# Patient Record
Sex: FEMALE | Race: BLACK OR AFRICAN AMERICAN | Employment: FULL TIME | URBAN - METROPOLITAN AREA
[De-identification: names, ages, dates, MRNs, and addresses within clinical notes are randomized per-mention and may not be internally consistent; named-entity substitution may affect disease eponyms.]

---

## 2020-07-10 ENCOUNTER — HOSPITAL ENCOUNTER (EMERGENCY)
Age: 65
Discharge: HOME | End: 2020-07-11
Attending: EMERGENCY MEDICINE
Payer: COMMERCIAL

## 2020-07-10 VITALS
HEIGHT: 64 IN | BODY MASS INDEX: 27.31 KG/M2 | HEART RATE: 126 BPM | RESPIRATION RATE: 16 BRPM | DIASTOLIC BLOOD PRESSURE: 79 MMHG | SYSTOLIC BLOOD PRESSURE: 134 MMHG | TEMPERATURE: 98.1 F | WEIGHT: 160 LBS | OXYGEN SATURATION: 99 %

## 2020-07-10 LAB
REASON FOR REJECTION: NORMAL
REJECTED TEST: NORMAL

## 2020-07-10 PROCEDURE — 99283 EMERGENCY DEPT VISIT LOW MDM: CPT

## 2020-07-10 RX ORDER — LIDOCAINE 4 G/G
1 PATCH TOPICAL DAILY
Status: DISCONTINUED | OUTPATIENT
Start: 2020-07-11 | End: 2020-07-11 | Stop reason: HOSPADM

## 2020-07-10 RX ORDER — ORPHENADRINE CITRATE 30 MG/ML
60 INJECTION INTRAMUSCULAR; INTRAVENOUS ONCE
Status: COMPLETED | OUTPATIENT
Start: 2020-07-10 | End: 2020-07-11

## 2020-07-10 RX ORDER — CYCLOBENZAPRINE HCL 10 MG
10 TABLET ORAL 2 TIMES DAILY PRN
COMMUNITY
Start: 2020-07-07

## 2020-07-10 RX ORDER — ORPHENADRINE CITRATE 30 MG/ML
60 INJECTION INTRAMUSCULAR; INTRAVENOUS ONCE
Status: DISCONTINUED | OUTPATIENT
Start: 2020-07-10 | End: 2020-07-10

## 2020-07-10 RX ORDER — POTASSIUM CHLORIDE 20 MEQ/1
TABLET, EXTENDED RELEASE ORAL
COMMUNITY
Start: 2019-10-22

## 2020-07-10 RX ORDER — MORPHINE SULFATE 4 MG/ML
4 INJECTION, SOLUTION INTRAMUSCULAR; INTRAVENOUS ONCE
Status: DISCONTINUED | OUTPATIENT
Start: 2020-07-10 | End: 2020-07-10

## 2020-07-10 RX ORDER — HYDROMORPHONE HYDROCHLORIDE 1 MG/ML
1 INJECTION, SOLUTION INTRAMUSCULAR; INTRAVENOUS; SUBCUTANEOUS ONCE
Status: COMPLETED | OUTPATIENT
Start: 2020-07-10 | End: 2020-07-11

## 2020-07-10 RX ORDER — 0.9 % SODIUM CHLORIDE 0.9 %
500 INTRAVENOUS SOLUTION INTRAVENOUS ONCE
Status: DISCONTINUED | OUTPATIENT
Start: 2020-07-10 | End: 2020-07-11 | Stop reason: HOSPADM

## 2020-07-10 ASSESSMENT — PAIN SCALES - GENERAL
PAINLEVEL_OUTOF10: 8
PAINLEVEL_OUTOF10: 8

## 2020-07-10 ASSESSMENT — PAIN DESCRIPTION - PAIN TYPE: TYPE: ACUTE PAIN

## 2020-07-11 LAB
A/G RATIO: 0.8 (ref 1.1–2.2)
ALBUMIN SERPL-MCNC: 3.5 G/DL (ref 3.4–5)
ALP BLD-CCNC: 84 U/L (ref 40–129)
ALT SERPL-CCNC: 26 U/L (ref 10–40)
ANION GAP SERPL CALCULATED.3IONS-SCNC: 17 MMOL/L (ref 3–16)
AST SERPL-CCNC: 35 U/L (ref 15–37)
BASOPHILS ABSOLUTE: 0 K/UL (ref 0–0.2)
BASOPHILS RELATIVE PERCENT: 0.2 %
BILIRUB SERPL-MCNC: 1.1 MG/DL (ref 0–1)
BUN BLDV-MCNC: 20 MG/DL (ref 7–20)
CALCIUM SERPL-MCNC: 9.8 MG/DL (ref 8.3–10.6)
CHLORIDE BLD-SCNC: 98 MMOL/L (ref 99–110)
CO2: 19 MMOL/L (ref 21–32)
CREAT SERPL-MCNC: 1.3 MG/DL (ref 0.6–1.2)
EOSINOPHILS ABSOLUTE: 0 K/UL (ref 0–0.6)
EOSINOPHILS RELATIVE PERCENT: 0 %
GFR AFRICAN AMERICAN: 50
GFR NON-AFRICAN AMERICAN: 41
GLOBULIN: 4.4 G/DL
GLUCOSE BLD-MCNC: 126 MG/DL (ref 70–99)
HCT VFR BLD CALC: 37.1 % (ref 36–48)
HEMOGLOBIN: 12.5 G/DL (ref 12–16)
LYMPHOCYTES ABSOLUTE: 0.6 K/UL (ref 1–5.1)
LYMPHOCYTES RELATIVE PERCENT: 9.6 %
MAGNESIUM: 2 MG/DL (ref 1.8–2.4)
MCH RBC QN AUTO: 32.6 PG (ref 26–34)
MCHC RBC AUTO-ENTMCNC: 33.8 G/DL (ref 31–36)
MCV RBC AUTO: 96.6 FL (ref 80–100)
MONOCYTES ABSOLUTE: 0.8 K/UL (ref 0–1.3)
MONOCYTES RELATIVE PERCENT: 12.4 %
NEUTROPHILS ABSOLUTE: 4.9 K/UL (ref 1.7–7.7)
NEUTROPHILS RELATIVE PERCENT: 77.8 %
PDW BLD-RTO: 15.5 % (ref 12.4–15.4)
PLATELET # BLD: 242 K/UL (ref 135–450)
PMV BLD AUTO: 7.4 FL (ref 5–10.5)
POTASSIUM SERPL-SCNC: 3.5 MMOL/L (ref 3.5–5.1)
RBC # BLD: 3.84 M/UL (ref 4–5.2)
SODIUM BLD-SCNC: 134 MMOL/L (ref 136–145)
TOTAL PROTEIN: 7.9 G/DL (ref 6.4–8.2)
WBC # BLD: 6.3 K/UL (ref 4–11)

## 2020-07-11 PROCEDURE — 83735 ASSAY OF MAGNESIUM: CPT

## 2020-07-11 PROCEDURE — 80053 COMPREHEN METABOLIC PANEL: CPT

## 2020-07-11 PROCEDURE — 96372 THER/PROPH/DIAG INJ SC/IM: CPT

## 2020-07-11 PROCEDURE — 6360000002 HC RX W HCPCS: Performed by: PHYSICIAN ASSISTANT

## 2020-07-11 PROCEDURE — 85025 COMPLETE CBC W/AUTO DIFF WBC: CPT

## 2020-07-11 PROCEDURE — 6370000000 HC RX 637 (ALT 250 FOR IP)

## 2020-07-11 RX ORDER — OXYCODONE HYDROCHLORIDE AND ACETAMINOPHEN 5; 325 MG/1; MG/1
TABLET ORAL
Status: COMPLETED
Start: 2020-07-11 | End: 2020-07-11

## 2020-07-11 RX ADMIN — OXYCODONE AND ACETAMINOPHEN: 5; 325 TABLET ORAL at 03:06

## 2020-07-11 RX ADMIN — ORPHENADRINE CITRATE 60 MG: 30 INJECTION INTRAMUSCULAR; INTRAVENOUS at 03:07

## 2020-07-11 RX ADMIN — HYDROMORPHONE HYDROCHLORIDE 1 MG: 1 INJECTION, SOLUTION INTRAMUSCULAR; INTRAVENOUS; SUBCUTANEOUS at 03:05

## 2020-07-11 ASSESSMENT — PAIN SCALES - GENERAL
PAINLEVEL_OUTOF10: 4
PAINLEVEL_OUTOF10: 10

## 2020-07-11 NOTE — ED PROVIDER NOTES
I received a phone call from this patient at about 1:45 PM.  She continues to have pain in her feet. Her symptoms are similar to her note as described by Lita Patrick from yesterday. She states she was told she would get an outpatient script for her gout but this was forgotten. Given her elevated creatinine, I will write a 5 day prescription for prednisone to treat gout. She is aware she needs to followup this week with her primary physician. Return precautions given.       Yady Swann MD  07/11/20 1562

## 2020-07-11 NOTE — ED PROVIDER NOTES
[facility]        Pt Name: Carley Shelton  MRN: [id number]  [place] 1955  Date of evaluation: 7/10/2020  Provider: Alban Claude, PA-C  PCP: Keeley Escobar     I have seen and evaluated this patient with my supervising physician Yamileth Singh MD.    [address] [facility]       Chief Complaint   Patient presents with    Back Pain     Pt reporting chronic back pain and gout flare up for a couplde days. In by EMS from home. HISTORY OF PRESENT ILLNESS   (Location, Timing/Onset, Context/Setting, Quality, Duration, Modifying Factors, Severity, Associated Signs and Symptoms)  Note limiting factors. Carley Shelton is a 60 y.o. female that presents to the emergency department with a chief complaint of some back spasms that began 5 days ago. She states she gets these frequently since last summer after she was started on chemotherapy for breast cancer and was told that it was a side effect to her chemotherapy. 2 days ago began getting some pain in her bilateral feet mostly her great toes and her right knee that feels like her gouty arthritis. She has been taking Tylenol muscle relaxants for the pain at home with minimal relief. States she has been scooting around the house to get around. Rates pain an 8 out of 10. Denies nausea, vomiting, fevers, chest pain, shortness of breath, difficulty urinating, urinary or bowel symptoms, fall or injury or trauma. She has had pain like this in her knee and feet before and states that she has had the spasms before. Denies any other symptoms. Nursing Notes were all reviewed and agreed with or any disagreements were addressed in the HPI. REVIEW OF SYSTEMS    (2-9 systems for level 4, 10 or more for level 5)     Review of Systems    Positives and Pertinent negatives as per HPI. Except as noted above in the ROS, all other systems were reviewed and negative.        PAST MEDICAL Nerves: No cranial nerve deficit. Psychiatric:         Behavior: Behavior normal.         DIAGNOSTIC RESULTS   LABS:    Labs Reviewed   COMPREHENSIVE METABOLIC PANEL - Abnormal; Notable for the following components:       Result Value    Sodium 134 (*)     Chloride 98 (*)     CO2 19 (*)     Anion Gap 17 (*)     Glucose 126 (*)     CREATININE 1.3 (*)     GFR Non- 41 (*)     GFR African American 50 (*)     Albumin/Globulin Ratio 0.8 (*)     Total Bilirubin 1.1 (*)     All other components within normal limits    Narrative:     Performed at:  [facility] [address], [address]   Phone [phone]   CBC WITH AUTO DIFFERENTIAL - Abnormal; Notable for the following components:    RBC 3.84 (*)     RDW 15.5 (*)     Lymphocytes Absolute 0.6 (*)     All other components within normal limits    Narrative:     Performed at:  [facility] [address],  [place], [address]   Phone [phone]    Narrative:     Jolie Zavala  [facility] tel. [phone],  Rejected Test CMP, MG Called to:EVELYN Martinez, 07/10/2020 23:03, by Cleo Comer  Performed at:  [facility] [address], [address]   Phone [phone]   MAGNESIUM    Narrative:     Performed at:  [facility] [address], [address]   Phone [phone]       All other labs were within normal range or not returned as of this dictation. EKG: All EKG's are interpreted by the Emergency Department Physician in the absence of a cardiologist.  Please see their note for interpretation of EKG.       RADIOLOGY:   Non-plain film images such as CT, Ultrasound and MRI are read by the radiologist. Plain radiographic images are visualized and preliminarily interpreted by the ED Provider with the below findings:        Interpretation per the Radiologist below, if available at the time of this note:    No orders to display     No results found. PROCEDURES   Unless otherwise noted below, none     Procedures    CRITICAL CARE TIME   N/A    CONSULTS:  None      EMERGENCY DEPARTMENT COURSE and DIFFERENTIAL DIAGNOSIS/MDM:   Vitals:    Vitals:    07/10/20 1918   BP: 134/79   Pulse: 126   Resp: 16   Temp: 98.1 °F (36.7 °C)   SpO2: 99%   Weight: 160 lb (72.6 kg)   Height: 5' 4\" (1.626 m)       Patient was given the following medications:  Medications   HYDROmorphone HCl PF (DILAUDID) injection 1 mg (1 mg Intramuscular Given 7/11/20 0305)   orphenadrine (NORFLEX) injection 60 mg (60 mg Intramuscular Given 7/11/20 0307)   oxyCODONE-acetaminophen (PERCOCET) 5-325 MG per tablet (  Given 7/11/20 0306)           Patient presented with some spasming in her low back with some right knee pain and bilateral foot pain. She states her knee pain and foot pain feels like her gout that she has had in the past.  She also states that she has had back spasms like this and relates this to her chemotherapy that she is on. She recently had PET scan on 6/5 that revealed some new asymmetric right L5-S1 facet uptake but was favored to be likely secondary to arthrosis. She was slightly tachycardic on presentation. Nursing staff struggled with IV access and patient was pushing oral fluids in the emergency department. Patient's stay have been during [facility] downtime so some vitals and documentation are not currently in [facility]. Still awaiting laboratory results at the end of my shift at midnight. Patient was feeling better after medication. Low suspicion for cauda equina, epidural abscess, cord injury, pyelonephritis, AAA, septic arthritis, cellulitis, arterial occlusion, DVT, acute abdomen or other emergent etiology. Do not believe imaging is warranted as patient has had pain like this in the past and recently had a PET scan. Please see attending note for final disposition. FINAL IMPRESSION      1.  Acute bilateral low back pain without sciatica    2. Arthralgia, unspecified joint          DISPOSITION/PLAN   DISPOSITION        PATIENT REFERREDTO:  No follow-up provider specified.     DISCHARGE MEDICATIONS:  Discharge Medication List as of 7/11/2020  3:10 AM          DISCONTINUED MEDICATIONS:  Discharge Medication List as of 7/11/2020  3:10 AM                 (Please note that portions of this note were completed with a voice recognition program.  Efforts were made to edit the dictations but occasionally words are mis-transcribed.)    Oanh Ruvalcaba PA-C (electronically signed)            Oanh uRvalcaba PA-C  07/16/20 1011

## 2020-07-12 NOTE — ED PROVIDER NOTES
As physician-in-triage, I performed a medical screening history and physical exam on Liamjozefkane RodriguezMarina Del Rey. I also cared for and evaluated the patient in conjunction with the ED Advanced Practice Provider. All diagnostic, treatment, and disposition decisions were made by myself in conjunction with the advanced practice provider. For all further details of the patient's emergency department visit, please see the advanced practice provider's documentation. Patient presents the ER for evaluation of acute on subacute back pain, with a prior history of breast cancer with prior history of mets no new metastases, no severe electrolyte abnormalities analgesia provided musculus with marked improvement in symptoms. Patient stable for outpatient management and outpatient follow-up with her oncologist and treating physicians.       Impression: Muscle skeletal back pain, metastatic cancer      Francesca Oden MD  54/53/21 1030       Francesca Oden MD  20/45/58 1212

## 2021-04-25 ENCOUNTER — APPOINTMENT (OUTPATIENT)
Dept: GENERAL RADIOLOGY | Age: 66
End: 2021-04-25
Payer: COMMERCIAL

## 2021-04-25 ENCOUNTER — HOSPITAL ENCOUNTER (EMERGENCY)
Age: 66
Discharge: HOME | End: 2021-04-25
Payer: COMMERCIAL

## 2021-04-25 VITALS
BODY MASS INDEX: 27.46 KG/M2 | HEIGHT: 64 IN | HEART RATE: 84 BPM | OXYGEN SATURATION: 99 % | RESPIRATION RATE: 16 BRPM | TEMPERATURE: 98 F | DIASTOLIC BLOOD PRESSURE: 79 MMHG | SYSTOLIC BLOOD PRESSURE: 144 MMHG

## 2021-04-25 DIAGNOSIS — G89.29 CHRONIC LEFT-SIDED LOW BACK PAIN WITHOUT SCIATICA: ICD-10-CM

## 2021-04-25 DIAGNOSIS — I89.0 LYMPHEDEMA OF RIGHT ARM: ICD-10-CM

## 2021-04-25 DIAGNOSIS — M86.9 OSTEITIS PUBIS (HCC): Primary | ICD-10-CM

## 2021-04-25 DIAGNOSIS — M47.816 ARTHRITIS OF LUMBAR SPINE: ICD-10-CM

## 2021-04-25 DIAGNOSIS — M54.50 CHRONIC LEFT-SIDED LOW BACK PAIN WITHOUT SCIATICA: ICD-10-CM

## 2021-04-25 DIAGNOSIS — R10.2 PELVIC PAIN: ICD-10-CM

## 2021-04-25 PROCEDURE — 99285 EMERGENCY DEPT VISIT HI MDM: CPT

## 2021-04-25 PROCEDURE — 6370000000 HC RX 637 (ALT 250 FOR IP): Performed by: PHYSICIAN ASSISTANT

## 2021-04-25 PROCEDURE — 73522 X-RAY EXAM HIPS BI 3-4 VIEWS: CPT

## 2021-04-25 PROCEDURE — 72100 X-RAY EXAM L-S SPINE 2/3 VWS: CPT

## 2021-04-25 RX ORDER — OXYCODONE HYDROCHLORIDE AND ACETAMINOPHEN 5; 325 MG/1; MG/1
1 TABLET ORAL EVERY 6 HOURS PRN
Qty: 12 TABLET | Refills: 0 | Status: SHIPPED | OUTPATIENT
Start: 2021-04-25 | End: 2021-04-28

## 2021-04-25 RX ORDER — OXYCODONE HYDROCHLORIDE 5 MG/1
5 TABLET ORAL ONCE
Status: COMPLETED | OUTPATIENT
Start: 2021-04-25 | End: 2021-04-25

## 2021-04-25 RX ORDER — ORPHENADRINE CITRATE 100 MG/1
100 TABLET, EXTENDED RELEASE ORAL ONCE
Status: COMPLETED | OUTPATIENT
Start: 2021-04-25 | End: 2021-04-25

## 2021-04-25 RX ORDER — PREDNISONE 10 MG/1
40 TABLET ORAL DAILY
Qty: 20 TABLET | Refills: 0 | Status: SHIPPED | OUTPATIENT
Start: 2021-04-25 | End: 2021-04-30

## 2021-04-25 RX ADMIN — ORPHENADRINE CITRATE 100 MG: 100 TABLET, EXTENDED RELEASE ORAL at 15:23

## 2021-04-25 RX ADMIN — OXYCODONE HYDROCHLORIDE 5 MG: 5 TABLET ORAL at 15:23

## 2021-04-25 ASSESSMENT — PAIN DESCRIPTION - LOCATION
LOCATION: BACK
LOCATION: ARM

## 2021-04-25 ASSESSMENT — PAIN DESCRIPTION - PROGRESSION
CLINICAL_PROGRESSION: GRADUALLY IMPROVING
CLINICAL_PROGRESSION_2: GRADUALLY WORSENING
CLINICAL_PROGRESSION: NOT CHANGED

## 2021-04-25 ASSESSMENT — PAIN - FUNCTIONAL ASSESSMENT
PAIN_FUNCTIONAL_ASSESSMENT: ACTIVITIES ARE NOT PREVENTED
PAIN_FUNCTIONAL_ASSESSMENT: 0-10
PAIN_FUNCTIONAL_ASSESSMENT: ACTIVITIES ARE NOT PREVENTED
PAIN_FUNCTIONAL_ASSESSMENT: ACTIVITIES ARE NOT PREVENTED

## 2021-04-25 ASSESSMENT — ENCOUNTER SYMPTOMS
VOMITING: 0
BACK PAIN: 1
NAUSEA: 0
SHORTNESS OF BREATH: 0
SORE THROAT: 0
ABDOMINAL PAIN: 0

## 2021-04-25 ASSESSMENT — PAIN DESCRIPTION - DESCRIPTORS
DESCRIPTORS: THROBBING
DESCRIPTORS: SPASM

## 2021-04-25 ASSESSMENT — PAIN DESCRIPTION - DURATION: DURATION_2: INTERMITTENT

## 2021-04-25 ASSESSMENT — PAIN SCALES - GENERAL
PAINLEVEL_OUTOF10: 6
PAINLEVEL_OUTOF10: 6

## 2021-04-25 ASSESSMENT — PAIN DESCRIPTION - ONSET: ONSET_2: ON-GOING

## 2021-04-25 ASSESSMENT — PAIN DESCRIPTION - PAIN TYPE
TYPE: ACUTE PAIN
TYPE: ACUTE PAIN

## 2021-04-25 ASSESSMENT — PAIN DESCRIPTION - FREQUENCY
FREQUENCY: CONTINUOUS
FREQUENCY: CONTINUOUS

## 2021-04-25 NOTE — ED PROVIDER NOTES
[address] [facility]      Pt Name: Charline Monroy  MRN: [id number]  [place] 1955  Date of evaluation: 4/25/2021  Provider: Mily Rothman PA-C    This patient was not seen and evaluated by the attending physician No att. providers found. CHIEF COMPLAINT       Chief Complaint   Patient presents with    Arm Pain     right arm pain, states fluid build up, had a massage in the arm but no relief, h/o breast cancer with right breast mastectomy with lymph node removal    Back Pain     lower back arthritis, having muscle spasms to her lower back         HISTORYOF PRESENT ILLNESS  (Location/Symptom, Timing/Onset, Context/Setting, Quality, Duration, Modifying Factors, Severity.)   Charline Monroy is a 60 y.o. female with PMH hashimoto, hypertension, breast cancer s/p right mastectomy and lymphectomy who presents to the emergency department complaining of low back pain, muscle spasms in the back and chest wall, and right arm swelling and pain. The patient is a cancer patient on a chemotherapy pill. She says the chemotherapy has damaged her bones and she has been diagnosed with arthritis in the lumbar spine. When she was first diagnosed she was unable to walk and underwent rehab at [facility]. She has chronic low back pain that has recently worsened. Pain is constant and worse with ambulation. Pain radiates into the groin/pelvis bilaterally. No new injury. She has been taking Tylenol, Flexeril and Aleve with minimal relief. She denies associated numbness or weakness of the legs or loss of control of bowel or bladder. No urinary symptoms. No fever. She also reports a history of lymphedema in the right arm and was unable to complete the full 3 months' of therapy starting back in October. It has not been bothering her until about a week ago when she started to develop pain in the wrist and along the medial elbow.  No increased swelling, redness, fever, chills or other complaints. Nursing Notes were reviewedand I agree. REVIEW OF SYSTEMS    (2-9 systems for level 4, 10 or more forlevel 5)     Review of Systems   Constitutional: Negative for chills and fever. HENT: Negative for sore throat. Respiratory: Negative for shortness of breath. Cardiovascular: Negative for chest pain. Gastrointestinal: Negative for abdominal pain, nausea and vomiting. Genitourinary: Negative for difficulty urinating and dysuria. Musculoskeletal: Positive for arthralgias, back pain and myalgias. Skin: Negative for rash. Neurological: Negative for weakness, light-headedness, numbness and headaches. Psychiatric/Behavioral: The patient is not nervous/anxious. All other systems reviewed and are negative. Except as noted above the remainder ofthe review of systems was reviewed and negative. PAST MEDICALHISTORY         Diagnosis Date    Hashimoto disease     Hypertension     Hyperuricemia     Hypokalemia     Liver disease     Multiple thyroid nodules 2011    Recreational drug use     cocaine    Vitamin D deficiency        SURGICAL HISTORY           Procedure Laterality Date     SECTION      GALLBLADDER SURGERY  2010    HYSTERECTOMY  2001    partial    TUBAL LIGATION         CURRENT MEDICATIONS       Previous Medications    ABEMACICLIB 100 MG TABS    Take 100 mg by mouth 2 times daily    ALLOPURINOL (ZYLOPRIM) 100 MG TABLET    Take 1 tablet by mouth 3 times daily. AMLODIPINE (NORVASC) 10 MG TABLET    Take 1 tablet by mouth daily. ATENOLOL (TENORMIN) 25 MG TABLET    Take 1 tablet by mouth daily. CYCLOBENZAPRINE (FLEXERIL) 10 MG TABLET    Take 10 mg by mouth 2 times daily as needed    LISINOPRIL (PRINIVIL) 20 MG TABLET    Take 1 tablet by mouth daily for 30 days. LOVASTATIN (MEVACOR) 40 MG TABLET    Take 1 tablet by mouth daily.     POTASSIUM CHLORIDE (K-DUR) 10 MEQ TABLET    TAKE ONE TABLET BY MOUTH EVERY DAY    POTASSIUM CHLORIDE (KLOR-CON M) 20 MEQ EXTENDED RELEASE TABLET    TAKE ONE TABLET BY MOUTH DAILY    POTASSIUM CHLORIDE (KLOR-CON) 20 MEQ PACKET    Take 20 mEq by mouth once for 1 dose. ALLERGIES     Patient has no known allergies. FAMILY HISTORY           Problem Relation Age of Onset    High Blood Pressure Mother     Cancer Brother         pancreas    Lupus Sister      Family Status   Relation Name Status    Mother  Alive        HTN, HRL    Brother          pancreatic CA    Father          91X, was alcoholic    Sister  Alive        Lupus    Sister  (Not Specified)        SOCIAL HISTORY    reports that she has quit smoking. Her smoking use included cigarettes. She has a 3.90 pack-year smoking history. She uses smokeless tobacco. She reports that she does not drink alcohol or use drugs. PHYSICAL EXAM    (up to 7 for level 4, 8 or more for level 5)     ED Triage Vitals [21 1502]   BP Temp Temp Source Pulse Resp SpO2 Height Weight   (!) 161/102 98 °F (36.7 °C) Oral 83 14 100 % 5' 4\" (1.626 m) --       Physical Exam  Vitals signs and nursing note reviewed. Constitutional:       General: She is not in acute distress. Appearance: She is well-developed. HENT:      Head: Normocephalic and atraumatic. Neck:      Musculoskeletal: Neck supple. Pulmonary:      Effort: Pulmonary effort is normal. No respiratory distress. Musculoskeletal:      Comments: Back: The patient has tenderness along the left lower back musculature without significant midline tenderness. There is no erythema, warmth, ecchymosis, crepitance or rash. Right arm: There is moderate diffuse swelling, nonpitting. She has slight tenderness along the medial aspect of the distal upper arm and proximal lower arm without erythema, ecchymosis, warmth, crepitance or rash. Full ROM of the shoulder, elbow and wrist with intact distal strength and sensation. Skin:     General: Skin is warm and dry.    Neurological:      General: No Resp: 14    Temp: 98 °F (36.7 °C)    TempSrc: Oral    SpO2: 100% 99%   Height: 5' 4\" (1.626 m)         I have evaluated this patient. My supervising physician was available for consultation. This patient presents with acute on chronic pain. Lumbar x-ray shows large amount of degenerative joint disease. Pelvis and bilateral hip x-ray reveals findings consistent with osteitis pubis. Osteomyelitis could have this appearance however the patient's pain has been chronic and gradual and she is afebrile so I think this is highly unlikely. The patient has been taking Aleve without relief. Therefore I will try a steroid burst and refer her for PT as well as to orthopedics where she may benefit from joint injection. She was also given Percocet for pain. The patient was asked to rest when at home and arrange for PT. Her arm exam is benign, no pain out of proportion to exam findings, no changes concerning for infection or DVT, peripheral pulse and strength intact. She will need to follow back up with her PCP. Discussed results, diagnosis and plan with patient and/or family. Questions addressed. Dispositionand follow-up agreed upon. Specific discharge instructions explained. The patient and/or family and I have discussed the diagnosis and risks, and we agree with discharging home to follow-up with their primary care,specialist or referral doctor. We also discussed returning to the Emergency Department immediately if new or worsening symptoms occur. We have discussed the symptoms which are most concerning that necessitate immediatereturn. PROCEDURES:  None    FINAL IMPRESSION      1. Osteitis pubis (HCC)    2. Pelvic pain    3. Chronic left-sided low back pain without sciatica    4. Arthritis of lumbar spine    5.  Lymphedema of right arm          DISPOSITION/PLAN   DISPOSITION Decision To Discharge 04/25/2021 04:22:27 PM      PATIENT REFERRED TO:  Olena NAIK/ [address] [place] [id number] [phone]    Schedule an appointment as soon as possible for a visit       Orlin Martinez, [address] [address]  [phone]    Schedule an appointment as soon as possible for a visit   follow up on hip and spine pain      MEDICATIONS:  New Prescriptions    OXYCODONE-ACETAMINOPHEN (PERCOCET) 5-325 MG PER TABLET    Take 1 tablet by mouth every 6 hours as needed for Pain for up to 3 days. Intended supply: 3 days.  Take lowest dose possible to manage pain    PREDNISONE (DELTASONE) 10 MG TABLET    Take 4 tablets by mouth daily for 5 days       (Please note that portions of this note were completed with a voice recognition program.  Efforts were made toedit the dictations but occasionally words are mis-transcribed.)    SCOTTIE Yarbrough PA-C  04/25/21 5409

## 2021-04-25 NOTE — ED NOTES
Discharge instructions and prescriptions discussed/given to pt, all questions answered. Pt is alert and oriented x4, no signs of acute distress noted. Pt left via wheelchair with family.       Gus Mendoza RN  04/25/21 6631

## 2021-04-25 NOTE — ED NOTES
Patient presents to ED for complaints of arm pain due to swelling. Pt reports that she has lymphedema and went ot get a massage to help but did not help. Pt also reports lower back spasms that radiate into groin area. No signs of acute distress noted at this time. Pt is alert and oriented x4. Family at bedside. Call light within reach. Teddy FIERRO at bedside.       Gus Mendoza RN  04/25/21 3279

## 2021-05-11 ENCOUNTER — HOSPITAL ENCOUNTER (OUTPATIENT)
Dept: PHYSICAL THERAPY | Age: 66
Setting detail: THERAPIES SERIES
Discharge: HOME | End: 2021-05-11
Payer: COMMERCIAL

## 2021-05-11 PROCEDURE — 97161 PT EVAL LOW COMPLEX 20 MIN: CPT

## 2021-05-11 PROCEDURE — 97110 THERAPEUTIC EXERCISES: CPT

## 2021-05-11 NOTE — PLAN OF CARE
[facility]. [address]  Phone: [phone]   Fax:     [phone]                                                       Physical Therapy Certification    Dear Referring Practitioner: Kit Wing PA-C,    We had the pleasure of evaluating the following patient for physical therapy services at [facility]. A summary of our findings can be found in the initial assessment below. This includes our plan of care. If you have any questions or concerns regarding these findings, please do not hesitate to contact me at the office phone number checked above. Thank you for the referral.       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   [] Yes, Patient intake triggered C-SSRS Screening      [] C-SSRS Screening completed  [] PCP notified via Epic     SUBJECTIVE: Patient stated complaint: Pt reports she's had issues with her back and pelvic region multiple times- had therapy last summer for this. Reports she did PT and was able to start walking more. Reports she had a flare up again, would like to have a regime to do daily to prevent for further issues. Taking ferzinio for cancer treatment (breast cancer), had a recurrence in 2019. Reports she's tried doing some exercises at home (clamshells). Uses a walker as needed. Reports she's has burning in her right hip. Pain does wake her at night. Reports she does get popping and states it hurts when it happens. Relevant Medical History:Additional Pertinent Hx: Hashimoto, Hypertension  Functional Outcome Measure: [place] = 50/100 LEFS: 17/80 (79%)    Pain Scale: 6-10/10  Easing factors: Aleve, sleeping with pillow between knees  Provocative factors: bending forward, walking, standing, stairs, sleeping    Type: [x]Constant   []Intermittent  []Radiating []Localized []other:     Numbness/Tingling: Denies    Occupation/School: Works at [employer], supervisor at [employer]. Living Status/Prior Level of Function:Modified Independent with ADLs, wants to walk dog and clean house.       OBJECTIVE:   Posture: inc thoracic kyphosis, shifts wt onto LLE in standing    Functional Mobility/Transfers: slow transfers    Palpation: tight ITB bilat    Gait: (include devices/WB status) antalgic gait, compensated trendelenberg in L SLS    Bandages/Dressings/Incisions: NA      ROM  Comments   Lumbar Flex 45 deg    Lumbar Ext 5 deg pain      ROM LEFT RIGHT Comments   Lumbar Side Bend 2\" from knee 1/2\" from knee     Lumbar Rotation      Quadrant      Hip Flexion 4/5 4-/5    Hip Abd      Hip ER      Hip IR      Hip Extension      Knee Ext 4-/5 4/5    Knee Flex 4-/5 4/5    Hamstring Flex      Piriformis      Babatunde test                Myotomes/Strength Normal Abnormal Comments   [x]ALL NORMAL      Hip Abd      Hip Ext      Hip flexion (L1-L2 femoral) [] []    Knee extension (L2-L4 femoral) [] []    Knee flexion (S1 sciatic)      Dorsiflexion (L4-L5 deep peroneal) [] []    Great Toe Ext (L5 deep peroneal nerve) [] []    Ankle Eversion (S1-S2 super peroneal) [] []    Ankle PF(S1-S2 tibial) [] []    Multifidus [] []    Transverse Ab [] []      Dermatomes Normal Abnormal Comments   [x]ALL NORMAL            inguinal area (L1)  [] []    anterior mid-thigh (L2) [] []    distal ant thigh/med knee (L3) [] []    medial lower leg and foot (L4) [] []    lateral lower leg and foot (L5) [] []    posterior calf (S1) [] []    medial calcaneus (S2) [] []      Reflexes Normal Abnormal Comments   []ALL NORMAL            S1-2 Seated achilles [] []    S1-2 Prone knee bend [] []    L3-4 Patellar tendon [x] [] Bilaterally 2+   C5-6 Biceps [] []    C6 Brachioradialis [] []    C7-8 Triceps [] []    Clonus [] []    Babinski [] []    Mart's [] []      Joint mobility: hips, lumbar not formally assessed today- can assume hypomobile based on observed mobility   [x]Normal    []Hypo   []Hyper    Orthopedic Special Tests:    Neural dynamic tension testing Normal Abnormal Comments   Slump Test  - Degree of knee flexion:  [] []    SLR  [] []    0-30 [] []    30-70 [] []    Femoral nerve (L2-4) [] []       Normal Abnormal N/A Comments   Fwd Bend-aberrant or innominate mvmt) [] [] []    Trendelenburg [] [] []    Kemps/Quadrant [] [] []    Crispin Ditty [] [] []    BETZAIDA/Pedro Pablo [x] [] []    Hip scour [x] [] []    Supine to sit [] [] []    Prone knee bend [] [] []           Hip thrust [] [] []    SI distraction/compression [] [] []    Sacral Spring/thrust [] [] []               [x] Patient history, allergies, meds reviewed. Medical chart reviewed. See intake form. Review Of Systems (ROS):  [x]Performed Review of systems (Integumentary, CardioPulmonary, Neurological) by intake and observation. Intake form has been scanned into medical record. Patient has been instructed to contact their primary care physician regarding ROS issues if not already being addressed at this time.       Co-morbidities/Complexities (which will affect course of rehabilitation):  []None           Arthritic conditions   [x]Rheumatoid arthritis (M05.9)  []Osteoarthritis (M19.91)   Cardiovascular conditions   [x]Hypertension (I10)  []Hyperlipidemia (E78.5)  []Angina pectoris (I20)  []Atherosclerosis (I70)  []CVA Musculoskeletal conditions   []Disc pathology   []Congenital spine pathologies   []Prior surgical intervention  []Osteoporosis (M81.8)  []Osteopenia (M85.8)   Endocrine conditions   []Hypothyroid (E03.9)  []Hyperthyroid Gastrointestinal conditions   []Constipation (M78.38)   Metabolic conditions   []Morbid obesity (E66.01)  []Diabetes type 1(E10.65) or 2 (E11.65)   []Neuropathy (G60.9)     Pulmonary conditions   []Asthma (J45)  []Coughing   []COPD (J44.9)   Psychological Disorders  []Anxiety (F41.9)  []Depression (F32.9)   []Other:   [x]Other:   Breast cancer        Barriers to/and or personal factors that will affect rehab potential:              []Age  []Sex    []Smoker              []Motivation/Lack of Motivation                        [x]Co-Morbidities              []Cognitive Function, education/learning barriers              []Environmental, home barriers              []profession/work barriers  []past PT/medical experience  []other:  Justification: Pt should progress well with PT    Falls Risk Assessment (30 days):   [x] Falls Risk assessed and no intervention required.   [] Falls Risk assessed and Patient requires intervention due to being higher risk   TUG score (>12s at risk):     [] Falls education provided, including:         ASSESSMENT:   Functional Impairments:     [x]Noted lumbar/proximal hip hypomobility   []Noted lumbosacral and/or generalized hypermobility   [x]Decreased Lumbosacral/hip/LE functional ROM   [x]Decreased core/proximal hip strength and neuromuscular control    [x]Decreased LE functional strength    [x]Abnormal reflexes/sensation/myotomal/dermatomal deficits  [x]Reduced balance/proprioceptive control    []other:      Functional Activity Limitations (from functional questionnaire and intake)   [x]Reduced ability to tolerate prolonged functional positions   [x]Reduced ability or difficulty with changes of positions or transfers between positions   [x]Reduced ability to maintain good posture and demonstrate good body mechanics with sitting, bending, and lifting   [x]Reduced ability to sleep   [x] Reduced ability or tolerance with driving and/or computer work   [x]Reduced ability to perform lifting, reaching, carrying tasks   [x]Reduced ability to squat   [x]Reduced ability to forward bend   [x]Reduced ability to ambulate prolonged functional periods/distances/surfaces   [x]Reduced ability to ascend/descend stairs   []other:       Participation Restrictions   [x]Reduced participation in self care activities   [x]Reduced participation in home management activities   [x]Reduced participation in work activities   [x]Reduced participation in social activities. [x]Reduced participation in sport/recreational activities. Classification:   [x]Signs/symptoms consistent with Lumbar instability/stabilization subgroup. []Signs/symptoms consistent with Lumbar mobilization/manipulation subgroup, myotomes and dermatomes intact. Meets manipulation criteria. []Signs/symptoms consistent with Lumbar direction specific/centralization subgroup   []Signs/symptoms consistent with Lumbar traction subgroup       []Signs/symptoms consistent with lumbar facet dysfunction   []Signs/symptoms consistent with lumbar stenosis type dysfunction   []Signs/symptoms consistent with nerve root involvement including myotome & dermatome dysfunction   []Signs/symptoms consistent with post-surgical status including: decreased ROM, strength and function.    []signs/symptoms consistent with pathology which may benefit from Dry needling     []other:      Prognosis/Rehab Potential:      []Excellent   [x]Good    []Fair   []Poor    Tolerance of evaluation/treatment:    []Excellent   [x]Good    []Fair   []Poor     Physical Therapy Evaluation Complexity Justification  [x] A history of present problem with:  [] no personal factors and/or comorbidities that impact the plan of care;  []1-2 personal factors and/or comorbidities that impact the plan of care  [x]3 personal factors and/or comorbidities that impact the plan of care  [x] An examination of body systems using standardized tests and measures addressing any of the following: body structures and functions (impairments), activity limitations, and/or participation restrictions;:  [x] a total of 1-2 or more elements   [] a total of 3 or more elements   [] a total of 4 or more elements   [x] A clinical presentation with:  [] stable and/or uncomplicated characteristics   [x] evolving clinical presentation with changing characteristics  [] unstable and unpredictable characteristics;   [x] Clinical decision making of [x] low, [] moderate, [] high complexity using standardized patient assessment instrument and/or measurable assessment of functional outcome. [x] EVAL (LOW) 05541 (typically 20 minutes face-to-face)  [] EVAL (MOD) 63159 (typically 30 minutes face-to-face)  [] EVAL (HIGH) 22000 (typically 45 minutes face-to-face)  [] RE-EVAL     PLAN: Begin PT focusing on: proximal hip mobilizations, LB mobs, LB core activation, proximal hip activation, and HEP    Frequency/Duration: 1-2 days per week for 6-8 Weeks:  Interventions:  [x]  Therapeutic exercise including: strength training, ROM, for LE, Glutes and core   [x]  NMR activation and proprioception for glutes , LE and Core   [x]  Manual therapy as indicated for Hip complex, LE and spine to include: Dry Needling/IASTM, STM, PROM, Gr I-IV mobilizations, manipulation. [x]  Modalities as needed that may include: thermal agents, E-stim, Biofeedback, US, iontophoresis as indicated  [x]  Patient education on joint protection, postural re-education, activity modification, progression of HEP.     HEP instruction: (see scanned

## 2021-05-11 NOTE — FLOWSHEET NOTE
[facility] [place]. 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)  · Treatment Diagnosis: Lumbar pain (M54.5), Weakness (X16.0)    Insurance/Certification information:  PT Insurance Information: [facility]  Physician Information:  Referring Practitioner: Steffi Hamman, PA-C  Plan of care signed (Y/N):     Date of Patient follow up with Physician: none scheduled     Progress Report: []  Yes  [x]  No     Date Range for reporting period:  Beginnin2021  Ending:    Progress report due (10 Rx/or 30 days whichever is less): 27    Recertification due (POC duration/ or 90 days whichever is less): 21    Visit # POC/ Insurance Allowable Auth Needed   1 30 []Yes    [x]No     Functional Scale:       Date Assessed: at eval  Test: Live  Score: 50/100    Pain level:  6-9/10     History of Injury: Pt with history of lumbar and pelvic pain a year ago, flared up again on  that brought her to the ER. Currently off of work d/t mobility limitations. SUBJECTIVE:  See eval    OBJECTIVE:    Observation:    Test measurements:      RESTRICTIONS/PRECAUTIONS: breast cancer, HTN, RA    Exercises/Interventions: limited therex today as pt needed additional time to fill out paperwork for evaluation.     Therapeutic Ex (40928)   Min: Resistance/Reps Notes/Cues   LTR 5\" x 5    HL TA 5\" x 10    Standing quad stretch 30\" x 3                   Pt ed 5' HEP, POC, Ice/heat, activity mod and AD use             Manual Intervention (54104) Min: NMR re-education (94569)   Min:     Mf Activation- re-ed     TrA Re-ed activation     Glute Max re-ed activation          Therapeutic Activity (36399) Min:               Modalities  Min:             Other Therapeutic Activities:  Pt was educated on PT POC, Diagnosis, Prognosis, pathomechanics as well as frequency and duration of scheduling future physical therapy appointments. Time was also taken on this day to answer all patient questions and participation in PT. Reviewed appointment policy in detail with patient and patient verbalized understanding. Home Exercise Program: Access Code: [email or web address]  URL: [email or web address]  Date: 05/11/2021  Prepared by: Irlanda Martinez    Therapeutic Exercise and NMR EXR  [x] (15774) Provided verbal/tactile cueing for activities related to strengthening, flexibility, endurance, ROM  for improvements in proximal hip and core control with self care, mobility, lifting and ambulation.  [] (95770) Provided verbal/tactile cueing for activities related to improving balance, coordination, kinesthetic sense, posture, motor skill, proprioception  to assist with core control in self care, mobility, lifting, and ambulation.      Therapeutic Activities:    [] (73297 or 59400) Provided verbal/tactile cueing for activities related to improving balance, coordination, kinesthetic sense, posture, motor skill, proprioception and motor activation to allow for proper function  with self care and ADLs  [] (48267) Provided training and instruction to the patient for proper core and proximal hip recruitment and positioning with ambulation re-education     Home Exercise Program:    [x] (53015) Reviewed/Progressed HEP activities related to strengthening, flexibility, endurance, ROM of core, proximal hip and LE for functional self-care, mobility, lifting and ambulation   [] (25487) Reviewed/Progressed HEP activities related to improving balance, coordination, kinesthetic sense, posture, motor skill, proprioception of core, proximal hip and LE for self care, mobility, lifting, and ambulation      Manual Treatments:  PROM / STM / Oscillations-Mobs:  G-I, II, III, IV (PA's, Inf., Post.)  [] (72891) Provided manual therapy to mobilize proximal hip and LS spine soft tissue/joints for the purpose of modulating pain, promoting relaxation,  increasing ROM, reducing/eliminating soft tissue swelling/inflammation/restriction, improving soft tissue extensibility and allowing for proper ROM for normal function with self care, mobility, lifting and ambulation. Approval Dates:  CPT Code Units Approved Units Used  Date Updated:                     Charges:  Timed Code Treatment Minutes: 18   Total Treatment Minutes: 35     [x] EVAL (LOW) 54967 (typically 20 minutes face-to-face)  [] EVAL (MOD) 57205 (typically 30 minutes face-to-face)  [] EVAL (HIGH) 82127 (typically 45 minutes face-to-face)  [] RE-EVAL     [x] GB(35543) x     [] Dry needle 1 or 2 Muscles (82660)  [] NMR (89796) x     [] Dry needle 3+ Muscles (82724)  [] Manual (98909) x     [] Ultrasound (25803) x  [] TA (14937) x     [] Mech Traction (62111)  [] ES(attended) (69318)     [] ES (un) (19597):   [] Vasopump (65110) [] Ionto (62351)   [] Other:    GOALS:  Patient stated goal: Improve strength and mobility  [] Progressing: [] Met: [] Not Met: [] Adjusted  Therapist goals for Patient:   Short Term Goals: To be achieved in: 2 weeks  1. Independent in HEP and progression per patient tolerance, in order to prevent re-injury. [] Progressing: [] Met: [] Not Met: [] Adjusted  2. Patient will have a decrease in pain to facilitate improvement in movement, function, and ADLs as indicated by Functional Deficits. [] Progressing: [] Met: [] Not Met: [] Adjusted    Long Term Goals: To be achieved in: 8 weeks  1. Disability index score of 25% or less for the [place] and/or 38% or less on LEFS to assist with reaching prior level of function.    [] Progressing: [] Met: [] Not Met: [] Adjusted  2. Patient will demonstrate increased AROM to 20 deg lumbar extension, full lateral flexion L to allow for proper joint functioning as indicated by patients Functional Deficits. [] Progressing: [] Met: [] Not Met: [] Adjusted  3. Patient will demonstrate an increase in Strength to 4+/5 grossly at bilateral hips and good core activation to allow for proper functional mobility as indicated by patients Functional Deficits. [] Progressing: [] Met: [] Not Met: [] Adjusted  4. Patient will return to walking, sitting and standing for work related activities without increased symptoms or restriction. [] Progressing: [] Met: [] Not Met: [] Adjusted  5. Patient will return to reciprocal stair negotiation without increased symptoms or restriction  (patient specific functional goal)    [] Progressing: [] Met: [] Not Met: [] Adjusted    ASSESSMENT:  See eval    Treatment/Activity Tolerance:  [x] Patient tolerated treatment well [] Patient limited by fatique  [] Patient limited by pain  [] Patient limited by other medical complications  [] Other:     Overall Progression Towards Functional goals/ Treatment Progress Update:  [] Patient is progressing as expected towards functional goals listed. [] Progression is slowed due to complexities/Impairments listed. [] Progression has been slowed due to co-morbidities.   [x] Plan just implemented, too soon to assess goals progression <30days   [] Goals require adjustment due to lack of progress  [] Patient is not progressing as expected and requires additional follow up with physician  [] Other:    Prognosis for POC: [x] Good [] Fair  [] Poor    Patient requires continued skilled intervention: [x] Yes  [] No        PLAN: See eval  [] Continue per plan of care [] Alter current plan (see comments)  [x] Plan of care initiated [] Hold pending MD visit [] Discharge    Electronically signed by: Sheryle East, PT,DPT [id number]    Note: If patient does not return for scheduled/recommended follow up visits, this note will serve as a discharge from care along with the most recent update on progress.

## 2021-05-18 ENCOUNTER — HOSPITAL ENCOUNTER (OUTPATIENT)
Dept: PHYSICAL THERAPY | Age: 66
Setting detail: THERAPIES SERIES
Discharge: HOME | End: 2021-05-18
Payer: COMMERCIAL

## 2021-05-18 PROCEDURE — 97110 THERAPEUTIC EXERCISES: CPT

## 2021-05-18 PROCEDURE — 97140 MANUAL THERAPY 1/> REGIONS: CPT

## 2021-05-18 NOTE — FLOWSHEET NOTE
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)  · Treatment Diagnosis: Lumbar pain (M54.5), Weakness (N83.2)    Insurance/Certification information:  PT Insurance Information: [facility]  Physician Information:  Referring Practitioner: Chandrika Cevallos PA-C  Plan of care signed (Y/N):     Date of Patient follow up with Physician: none scheduled     Progress Report: []  Yes  [x]  No     Date Range for reporting period:  Beginnin2021  Ending:    Progress report due (10 Rx/or 30 days whichever is less): 3/76/92    Recertification due (POC duration/ or 90 days whichever is less): 21    Visit # POC/ Insurance Allowable Auth Needed   2 30 []Yes    [x]No     Functional Scale:       Date Assessed: at eval  Test: Live  Score: 50/100    Pain level:  5/10     History of Injury: Pt with history of lumbar and pelvic pain a year ago, flared up again on  that brought her to the ER. Currently off of work d/t mobility limitations. SUBJECTIVE: Pt states she is feeling better than the first day. Has been doing her exercises at home. OBJECTIVE:    Observation: Improved gait   Test measurements:      RESTRICTIONS/PRECAUTIONS: breast cancer, HTN, RA    Exercises/Interventions: limited therex today as pt needed additional time to fill out paperwork for evaluation.     Therapeutic Ex (76736)   Min: Sets/sec Reps Notes/Cues   LTR 5\" 10    HL TA 5\" 10    HL TA + ADD 5\" 10    Standing quad stretch 30\" 3    Supine piriformis stretch 30\" 3 R/L    Standing HS stretch steps 30\" 3 R/L          Standing hip ABD   10x ea Cues for upright posture               Manual Intervention (20763)      STM/rollerstick bilateral quads, ITB, glutes 15'           NMR re-education (65888)                               Therapeutic Activity (72493)                   Modalities              Other Therapeutic Activities:  Pt was educated on PT POC, Diagnosis, Prognosis, pathomechanics as well as frequency and duration of scheduling future physical therapy appointments. Time was also taken on this day to answer all patient questions and participation in PT. Reviewed appointment policy in detail with patient and patient verbalized understanding. Home Exercise Program: Access Code: [email or web address]  URL: [email or web address]  Date: 05/11/2021  Prepared by: Reza Lopez  Access Code: [email or web address]  URL: [email or web address]  Date: 05/18/2021  Prepared by: Reza Lopez    Therapeutic Exercise and NMR EXR  [x] (54607) Provided verbal/tactile cueing for activities related to strengthening, flexibility, endurance, ROM  for improvements in proximal hip and core control with self care, mobility, lifting and ambulation.  [] (08329) Provided verbal/tactile cueing for activities related to improving balance, coordination, kinesthetic sense, posture, motor skill, proprioception  to assist with core control in self care, mobility, lifting, and ambulation.      Therapeutic Activities:    [] (06142 or 83026) Provided verbal/tactile cueing for activities related to improving balance, coordination, kinesthetic sense, posture, motor skill, proprioception and motor activation to allow for proper function  with self care and ADLs  [] (36589) Provided training and instruction to the patient for proper core and proximal hip recruitment and positioning with ambulation re-education     Home Exercise Program:    [x] (83469) Reviewed/Progressed HEP activities related to Deficits. [] Progressing: [] Met: [] Not Met: [] Adjusted    Long Term Goals: To be achieved in: 8 weeks  1. Disability index score of 25% or less for the [place] and/or 38% or less on LEFS to assist with reaching prior level of function. [] Progressing: [] Met: [] Not Met: [] Adjusted  2. Patient will demonstrate increased AROM to 20 deg lumbar extension, full lateral flexion L to allow for proper joint functioning as indicated by patients Functional Deficits. [] Progressing: [] Met: [] Not Met: [] Adjusted  3. Patient will demonstrate an increase in Strength to 4+/5 grossly at bilateral hips and good core activation to allow for proper functional mobility as indicated by patients Functional Deficits. [] Progressing: [] Met: [] Not Met: [] Adjusted  4. Patient will return to walking, sitting and standing for work related activities without increased symptoms or restriction. [] Progressing: [] Met: [] Not Met: [] Adjusted  5. Patient will return to reciprocal stair negotiation without increased symptoms or restriction  (patient specific functional goal)    [] Progressing: [] Met: [] Not Met: [] Adjusted    ASSESSMENT:  Pt demos improved gait today compared to first visit, responded well to rollerstick for soft tissue mobility. Pt unable to lay on stomach to attempt joint mobilizations. Overall did very well with therex today. Treatment/Activity Tolerance:  [x] Patient tolerated treatment well [] Patient limited by fatique  [] Patient limited by pain  [] Patient limited by other medical complications  [] Other:     Overall Progression Towards Functional goals/ Treatment Progress Update:  [] Patient is progressing as expected towards functional goals listed. [] Progression is slowed due to complexities/Impairments listed. [] Progression has been slowed due to co-morbidities.   [x] Plan just implemented, too soon to assess goals progression <30days   [] Goals require adjustment due to lack of

## 2021-05-20 ENCOUNTER — HOSPITAL ENCOUNTER (OUTPATIENT)
Dept: PHYSICAL THERAPY | Age: 66
Setting detail: THERAPIES SERIES
Discharge: HOME | End: 2021-05-20
Payer: COMMERCIAL

## 2021-05-20 PROCEDURE — 97140 MANUAL THERAPY 1/> REGIONS: CPT

## 2021-05-20 PROCEDURE — 97110 THERAPEUTIC EXERCISES: CPT

## 2021-05-20 NOTE — FLOWSHEET NOTE
stretch 30\" 3    Supine piriformis stretch 30\" 3 R/L    Standing HS stretch steps 30\" 3 R/L          Standing hip ABD   10x ea Cues for upright posture               Manual Intervention (29850)      STM/rollerstick bilateral quads, ITB, glutes  Long axis distraction RLE 15'           NMR re-education (97277)       Tandem balance bilat 10\" 3x ea                      Therapeutic Activity (59128)                   Modalities              Other Therapeutic Activities:  Pt was educated on PT POC, Diagnosis, Prognosis, pathomechanics as well as frequency and duration of scheduling future physical therapy appointments. Time was also taken on this day to answer all patient questions and participation in PT. Reviewed appointment policy in detail with patient and patient verbalized understanding. Home Exercise Program: Access Code: [email or web address]  URL: [email or web address]  Date: 05/11/2021  Prepared by: Bee Cannon  Access Code: [email or web address]  URL: [email or web address]  Date: 05/18/2021  Prepared by: Bee Cannon    Therapeutic Exercise and NMR EXR  [x] (89987) Provided verbal/tactile cueing for activities related to strengthening, flexibility, endurance, ROM  for improvements in proximal hip and core control with self care, mobility, lifting and ambulation.  [] (39848) Provided verbal/tactile cueing for activities related to improving balance, coordination, kinesthetic sense, posture, motor skill, proprioception  to assist with core control in self care, mobility, lifting, and ambulation.      Therapeutic Activities:    [] (66180 or 13635) Provided verbal/tactile cueing for activities related to improving balance, coordination, kinesthetic sense, posture, motor skill, proprioception and motor activation to allow for proper function  with self care and ADLs  [] (63769) Provided training and instruction to the patient for proper core and proximal hip recruitment and positioning with ambulation re-education Home Exercise Program:    [x] (17745) Reviewed/Progressed HEP activities related to strengthening, flexibility, endurance, ROM of core, proximal hip and LE for functional self-care, mobility, lifting and ambulation   [] (31069) Reviewed/Progressed HEP activities related to improving balance, coordination, kinesthetic sense, posture, motor skill, proprioception of core, proximal hip and LE for self care, mobility, lifting, and ambulation      Manual Treatments:  PROM / STM / Oscillations-Mobs:  G-I, II, III, IV (PA's, Inf., Post.)  [x] (77800) Provided manual therapy to mobilize proximal hip and LS spine soft tissue/joints for the purpose of modulating pain, promoting relaxation,  increasing ROM, reducing/eliminating soft tissue swelling/inflammation/restriction, improving soft tissue extensibility and allowing for proper ROM for normal function with self care, mobility, lifting and ambulation. Approval Dates:  CPT Code Units Approved Units Used  Date Updated:                     Charges:  Timed Code Treatment Minutes: 41   Total Treatment Minutes: 41     [] EVAL (LOW) 29783 (typically 20 minutes face-to-face)  [] EVAL (MOD) 55053 (typically 30 minutes face-to-face)  [] EVAL (HIGH) 15893 (typically 45 minutes face-to-face)  [] RE-EVAL     [x] IR(38692) x   2 [] Dry needle 1 or 2 Muscles (92838)  [] NMR (08085) x     [] Dry needle 3+ Muscles (93639)  [x] Manual (16535) x 1    [] Ultrasound (82515) x  [] TA (28500) x     [] Mech Traction (71918)  [] ES(attended) (59793)     [] ES (un) (72050):   [] Vasopump (17584) [] Ionto (58026)   [] Other:    GOALS:  Patient stated goal: Improve strength and mobility  [] Progressing: [] Met: [] Not Met: [] Adjusted  Therapist goals for Patient:   Short Term Goals: To be achieved in: 2 weeks  1. Independent in HEP and progression per patient tolerance, in order to prevent re-injury. [] Progressing: [] Met: [] Not Met: [] Adjusted  2.  Patient will have a decrease in pain to facilitate improvement in movement, function, and ADLs as indicated by Functional Deficits. [] Progressing: [] Met: [] Not Met: [] Adjusted    Long Term Goals: To be achieved in: 8 weeks  1. Disability index score of 25% or less for the [place] and/or 38% or less on LEFS to assist with reaching prior level of function. [] Progressing: [] Met: [] Not Met: [] Adjusted  2. Patient will demonstrate increased AROM to 20 deg lumbar extension, full lateral flexion L to allow for proper joint functioning as indicated by patients Functional Deficits. [] Progressing: [] Met: [] Not Met: [] Adjusted  3. Patient will demonstrate an increase in Strength to 4+/5 grossly at bilateral hips and good core activation to allow for proper functional mobility as indicated by patients Functional Deficits. [] Progressing: [] Met: [] Not Met: [] Adjusted  4. Patient will return to walking, sitting and standing for work related activities without increased symptoms or restriction. [] Progressing: [] Met: [] Not Met: [] Adjusted  5. Patient will return to reciprocal stair negotiation without increased symptoms or restriction  (patient specific functional goal)    [] Progressing: [] Met: [] Not Met: [] Adjusted    ASSESSMENT:  Pt did well with new therex today, challenged with single limb stance on RLE during standing hip ABD today. Did well with initiating balance work. Treatment/Activity Tolerance:  [x] Patient tolerated treatment well [] Patient limited by fatique  [] Patient limited by pain  [] Patient limited by other medical complications  [] Other:     Overall Progression Towards Functional goals/ Treatment Progress Update:  [] Patient is progressing as expected towards functional goals listed. [] Progression is slowed due to complexities/Impairments listed. [] Progression has been slowed due to co-morbidities.   [x] Plan just implemented, too soon to assess goals progression <30days   [] Goals require adjustment due to lack of progress  [] Patient is not progressing as expected and requires additional follow up with physician  [] Other:    Prognosis for POC: [x] Good [] Fair  [] Poor    Patient requires continued skilled intervention: [x] Yes  [] No        PLAN: Gait training  [x] Continue per plan of care [] Alter current plan (see comments)  [] Plan of care initiated [] Hold pending MD visit [] Discharge    Electronically signed by: Ignacio Ferro, PT,DPT [id number]    Note: If patient does not return for scheduled/recommended follow up visits, this note will serve as a discharge from care along with the most recent update on progress.

## 2021-05-25 ENCOUNTER — HOSPITAL ENCOUNTER (OUTPATIENT)
Dept: PHYSICAL THERAPY | Age: 66
Setting detail: THERAPIES SERIES
Discharge: HOME | End: 2021-05-25
Payer: COMMERCIAL

## 2021-05-25 NOTE — FLOWSHEET NOTE
[facility] [place].  [address]  Phone: [phone]   Fax:     [phone]    Physical Therapy  Cancellation/No-show Note  Patient Name:  Rubén Chambers  :  1955   Date:  2021  Cancelled visits to date: 1   No-shows to date: 1    Patient status for today's appointment patient:  [x]  Cancelled  []  Rescheduled appointment  []  No-show     Reason given by patient:  [x]  Patient ill  []  Conflicting appointment  []  No transportation    []  Conflict with work  []  No reason given  []  Other:     Comments:      Phone call information:   []  Phone call made today to patient at _ time at number provided:      []  Patient answered, conversation as follows:    []  Patient did not answer, message left as follows:  [x]  Phone call not made today    Electronically signed by:  Gina Chung [address]

## 2021-05-27 ENCOUNTER — HOSPITAL ENCOUNTER (OUTPATIENT)
Dept: PHYSICAL THERAPY | Age: 66
Setting detail: THERAPIES SERIES
Discharge: HOME | End: 2021-05-27
Payer: COMMERCIAL

## 2021-05-27 PROCEDURE — 97110 THERAPEUTIC EXERCISES: CPT

## 2021-05-27 PROCEDURE — 97140 MANUAL THERAPY 1/> REGIONS: CPT

## 2021-05-27 PROCEDURE — 97112 NEUROMUSCULAR REEDUCATION: CPT

## 2021-05-27 NOTE — FLOWSHEET NOTE
[facility] 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  Physician Information:  Referring Practitioner: Willow Dejesus PA-C  Plan of care signed (Y/N):     Date of Patient follow up with Physician: none scheduled     Progress Report: []  Yes  [x]  No     Date Range for reporting period:  Beginnin2021  Ending:    Progress report due (10 Rx/or 30 days whichever is less):     Recertification due (POC duration/ or 90 days whichever is less): 21    Visit # POC/ Insurance Allowable Auth Needed   4 30 []Yes    [x]No     Functional Scale:       Date Assessed: at eval  Test: Live  Score: 50/100    Pain level:  5/10     History of Injury: Pt with history of lumbar and pelvic pain a year ago, flared up again on  that brought her to the ER. Currently off of work d/t mobility limitations. SUBJECTIVE: Pt states she's been feeling less pain in her hip and feels like her walking is getting better as well. OBJECTIVE:    Observation: Improved gait   Test measurements:      RESTRICTIONS/PRECAUTIONS: breast cancer, HTN, RA    Exercises/Interventions: limited therex today as pt needed additional time to fill out paperwork for evaluation.     Therapeutic Ex (68889)   Min: Sets/sec Reps Notes/Cues            HL TA + ABD into lime TB TB 5\" 15    Bridge 3\" 10x Min range   SLR flex B 2 5 challenging   Clamshells R 3\" 10x    SB roll in 5\" 10x    Standing quad stretch 30\" 3    Supine piriformis stretch 30\" 3 R/L    Standing HS stretch steps 30\" 3 R/L                      Manual Intervention (53778)      STM/rollerstick bilateral quads, ITB, glutes  Long axis distraction RLE 13'           NMR re-education (65099)          Gait training including cone walking 5'  Cues for heel toe and narrowed CALVIN               Therapeutic Activity (77446)                   Modalities              Other Therapeutic Activities:  Pt was educated on PT POC, Diagnosis, Prognosis, pathomechanics as well as frequency and duration of scheduling future physical therapy appointments. Time was also taken on this day to answer all patient questions and participation in PT. Reviewed appointment policy in detail with patient and patient verbalized understanding. Home Exercise Program: Access Code: [email or web address]  URL: [email or web address]  Date: 05/11/2021  Prepared by: Janie Bocanegra  Access Code: [email or web address]  URL: [email or web address]  Date: 05/18/2021  Prepared by: Janie Bocanegra    Therapeutic Exercise and NMR EXR  [x] (18008) Provided verbal/tactile cueing for activities related to strengthening, flexibility, endurance, ROM  for improvements in proximal hip and core control with self care, mobility, lifting and ambulation.  [] (79051) Provided verbal/tactile cueing for activities related to improving balance, coordination, kinesthetic sense, posture, motor skill, proprioception  to assist with core control in self care, mobility, lifting, and ambulation.      Therapeutic Activities:    [] (06933 or 23201) Provided verbal/tactile cueing for activities related to improving balance, coordination, kinesthetic sense, posture, motor skill, proprioception and motor activation to allow for proper function  with self care and ADLs  [] (26907) Provided training and instruction to the patient for proper core and proximal hip recruitment and positioning with ambulation re-education     Home Exercise Program:    [x] (98641) Reviewed/Progressed HEP activities related to strengthening, flexibility, endurance, ROM of core, proximal hip and LE for functional self-care, mobility, lifting and ambulation   [] (01334) Reviewed/Progressed HEP activities related to improving balance, coordination, kinesthetic sense, posture, motor skill, proprioception of core, proximal hip and LE for self care, mobility, lifting, and ambulation      Manual Treatments:  PROM / STM / Oscillations-Mobs:  G-I, II, III, IV (PA's, Inf., Post.)  [x] (10496) Provided manual therapy to mobilize proximal hip and LS spine soft tissue/joints for the purpose of modulating pain, promoting relaxation,  increasing ROM, reducing/eliminating soft tissue swelling/inflammation/restriction, improving soft tissue extensibility and allowing for proper ROM for normal function with self care, mobility, lifting and ambulation. Approval Dates:  CPT Code Units Approved Units Used  Date Updated:                     Charges:  Timed Code Treatment Minutes: 48   Total Treatment Minutes: 48     [] EVAL (LOW) 65482 (typically 20 minutes face-to-face)  [] EVAL (MOD) 47012 (typically 30 minutes face-to-face)  [] EVAL (HIGH) 90832 (typically 45 minutes face-to-face)  [] RE-EVAL     [x] IZ(41514) x   1 [] Dry needle 1 or 2 Muscles (45958)  [x] NMR (28595) x 1    [] Dry needle 3+ Muscles (08120)  [x] Manual (99863) x 1    [] Ultrasound (44493) x  [] TA (96630) x     [] Mech Traction (01732)  [] ES(attended) (44307)     [] ES (un) (68794):   [] Vasopump (34039) [] Ionto (29155)   [] Other:    GOALS:  Patient stated goal: Improve strength and mobility  [] Progressing: [] Met: [] Not Met: [] Adjusted  Therapist goals for Patient:   Short Term Goals: To be achieved in: 2 weeks  1. Independent in HEP and progression per patient tolerance, in order to prevent re-injury.    [] Progressing: [] Met: [] Not Met: [] Adjusted  2. Patient will have a decrease in pain to facilitate improvement in movement, function, and ADLs as indicated by Functional Deficits. [] Progressing: [] Met: [] Not Met: [] Adjusted    Long Term Goals: To be achieved in: 8 weeks  1. Disability index score of 25% or less for the [place] and/or 38% or less on LEFS to assist with reaching prior level of function. [] Progressing: [] Met: [] Not Met: [] Adjusted  2. Patient will demonstrate increased AROM to 20 deg lumbar extension, full lateral flexion L to allow for proper joint functioning as indicated by patients Functional Deficits. [] Progressing: [] Met: [] Not Met: [] Adjusted  3. Patient will demonstrate an increase in Strength to 4+/5 grossly at bilateral hips and good core activation to allow for proper functional mobility as indicated by patients Functional Deficits. [] Progressing: [] Met: [] Not Met: [] Adjusted  4. Patient will return to walking, sitting and standing for work related activities without increased symptoms or restriction. [] Progressing: [] Met: [] Not Met: [] Adjusted  5. Patient will return to reciprocal stair negotiation without increased symptoms or restriction  (patient specific functional goal)    [] Progressing: [] Met: [] Not Met: [] Adjusted    ASSESSMENT:  Pt continues to demo improved pain levels as well as improved gait, especially following gait training today. Making good progress at this time. Fatigued most with SLR flex today. Treatment/Activity Tolerance:  [x] Patient tolerated treatment well [] Patient limited by fatique  [] Patient limited by pain  [] Patient limited by other medical complications  [] Other:     Overall Progression Towards Functional goals/ Treatment Progress Update:  [] Patient is progressing as expected towards functional goals listed. [] Progression is slowed due to complexities/Impairments listed. [] Progression has been slowed due to co-morbidities.   [x] Plan

## 2021-06-01 ENCOUNTER — HOSPITAL ENCOUNTER (OUTPATIENT)
Dept: PHYSICAL THERAPY | Age: 66
Setting detail: THERAPIES SERIES
Discharge: HOME | End: 2021-06-01
Payer: COMMERCIAL

## 2021-06-01 PROCEDURE — 97140 MANUAL THERAPY 1/> REGIONS: CPT

## 2021-06-01 PROCEDURE — 97110 THERAPEUTIC EXERCISES: CPT

## 2021-06-01 NOTE — FLOWSHEET NOTE
[facility] 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)  · Treatment Diagnosis: Lumbar pain (M54.5), Weakness (M69.6)    Insurance/Certification information:  PT Insurance Information: [facility]  Physician Information:  Referring Practitioner: Don Mendoza PA-C  Plan of care signed (Y/N):     Date of Patient follow up with Physician: none scheduled     Progress Report: []  Yes  [x]  No     Date Range for reporting period:  Beginnin2021  Ending:    Progress report due (10 Rx/or 30 days whichever is less): 3/67/82    Recertification due (POC duration/ or 90 days whichever is less): 21    Visit # POC/ Insurance Allowable Auth Needed   5 30 []Yes    [x]No     Functional Scale:       Date Assessed: at eval  Test: [place]  Score: 50/100    Pain level:  2/10     History of Injury: Pt with history of lumbar and pelvic pain a year ago, flared up again on  that brought her to the ER. Currently off of work d/t mobility limitations. SUBJECTIVE: Pt states she feels like she is getting stronger, able to do exercises with more ease at home. OBJECTIVE:    Observation: Improved gait   Test measurements:      RESTRICTIONS/PRECAUTIONS: breast cancer, HTN, RA    Exercises/Interventions: limited therex today as pt needed additional time to fill out paperwork for evaluation.     Therapeutic Ex (56057)   Min: Sets/sec Reps Notes/Cues                  Bridge w/ ADD 3\" 10x Min range   SLR flex B 2 5 challenging   Clamshells B 3\" 10x SB roll in (ER) 5\" 10x       Supine piriformis stretch 30\" 3 R/L    Standing HS stretch steps 30\" 3 R/L          Standing hip ABD  2 10x ea Good form   Standing TKE 3 kg 2 10    Minisquat  10x    FSU 4\"  10x                Manual Intervention (43478)      STM/rollerstick bilateral quads, ITB, glutes  Long axis distraction RLE 15'           NMR re-education (81056)       SLB 10\" 5x ea    HL TA w/ march 10x ea     Alternating shoulder ext w/ step fwd and TA 10x ea                             Therapeutic Activity (09192)                   Modalities              Other Therapeutic Activities:  Pt was educated on PT POC, Diagnosis, Prognosis, pathomechanics as well as frequency and duration of scheduling future physical therapy appointments. Time was also taken on this day to answer all patient questions and participation in PT. Reviewed appointment policy in detail with patient and patient verbalized understanding. Home Exercise Program:  Access Code: [email or web address]  URL: [email or web address]  Date: 06/01/2021  Prepared by: Charisse Guerra    Therapeutic Exercise and NMR EXR  [x] (65050) Provided verbal/tactile cueing for activities related to strengthening, flexibility, endurance, ROM  for improvements in proximal hip and core control with self care, mobility, lifting and ambulation.  [] (12094) Provided verbal/tactile cueing for activities related to improving balance, coordination, kinesthetic sense, posture, motor skill, proprioception  to assist with core control in self care, mobility, lifting, and ambulation.      Therapeutic Activities:    [] (58330 or 31574) Provided verbal/tactile cueing for activities related to improving balance, coordination, kinesthetic sense, posture, motor skill, proprioception and motor activation to allow for proper function  with self care and ADLs  [] (37222) Provided training and instruction to the patient for proper core and proximal hip recruitment and positioning with ambulation re-education     Home Exercise Program:    [x] (20518) Reviewed/Progressed HEP activities related to strengthening, flexibility, endurance, ROM of core, proximal hip and LE for functional self-care, mobility, lifting and ambulation   [] (12457) Reviewed/Progressed HEP activities related to improving balance, coordination, kinesthetic sense, posture, motor skill, proprioception of core, proximal hip and LE for self care, mobility, lifting, and ambulation      Manual Treatments:  PROM / STM / Oscillations-Mobs:  G-I, II, III, IV (PA's, Inf., Post.)  [x] (48021) Provided manual therapy to mobilize proximal hip and LS spine soft tissue/joints for the purpose of modulating pain, promoting relaxation,  increasing ROM, reducing/eliminating soft tissue swelling/inflammation/restriction, improving soft tissue extensibility and allowing for proper ROM for normal function with self care, mobility, lifting and ambulation. Approval Dates:  CPT Code Units Approved Units Used  Date Updated:                     Charges:  Timed Code Treatment Minutes: 45   Total Treatment Minutes: 45     [] EVAL (LOW) 91566 (typically 20 minutes face-to-face)  [] EVAL (MOD) 81128 (typically 30 minutes face-to-face)  [] EVAL (HIGH) 75996 (typically 45 minutes face-to-face)  [] RE-EVAL     [x] OV(69244) x   1 [] Dry needle 1 or 2 Muscles (14063)  [x] NMR (06762) x 1    [] Dry needle 3+ Muscles (82651)  [x] Manual (05515) x 1    [] Ultrasound (77203) x  [] TA (32690) x     [] Mech Traction (66321)  [] ES(attended) (95394)     [] ES (un) (53271):   [] Vasopump (42005) [] Ionto (94115)   [] Other:    GOALS:  Patient stated goal: Improve strength and mobility  [] Progressing: [] Met: [] Not Met: [] Adjusted  Therapist goals for Patient:   Short Term Goals: To be achieved in: 2 weeks  1. Independent in HEP and progression per patient tolerance, in order to prevent re-injury. [] Progressing: [] Met: [] Not Met: [] Adjusted  2.  Patient will have a decrease in pain to facilitate improvement in movement, function, and ADLs as indicated by Functional Deficits. [] Progressing: [] Met: [] Not Met: [] Adjusted    Long Term Goals: To be achieved in: 8 weeks  1. Disability index score of 25% or less for the [place] and/or 38% or less on LEFS to assist with reaching prior level of function. [] Progressing: [] Met: [] Not Met: [] Adjusted  2. Patient will demonstrate increased AROM to 20 deg lumbar extension, full lateral flexion L to allow for proper joint functioning as indicated by patients Functional Deficits. [] Progressing: [] Met: [] Not Met: [] Adjusted  3. Patient will demonstrate an increase in Strength to 4+/5 grossly at bilateral hips and good core activation to allow for proper functional mobility as indicated by patients Functional Deficits. [] Progressing: [] Met: [] Not Met: [] Adjusted  4. Patient will return to walking, sitting and standing for work related activities without increased symptoms or restriction. [] Progressing: [] Met: [] Not Met: [] Adjusted  5. Patient will return to reciprocal stair negotiation without increased symptoms or restriction  (patient specific functional goal)    [] Progressing: [] Met: [] Not Met: [] Adjusted    ASSESSMENT:  Pt continues to make excellent progress with PT. Improved endurance for strength work and her gait is near normal at this point. Treatment/Activity Tolerance:  [x] Patient tolerated treatment well [] Patient limited by fatique  [] Patient limited by pain  [] Patient limited by other medical complications  [] Other:     Overall Progression Towards Functional goals/ Treatment Progress Update:  [] Patient is progressing as expected towards functional goals listed. [] Progression is slowed due to complexities/Impairments listed. [] Progression has been slowed due to co-morbidities.   [x] Plan just implemented, too soon to assess goals progression <30days   [] Goals require adjustment due to lack of progress  [] Patient is not progressing as expected and requires additional follow up with physician  [] Other:    Prognosis for POC: [x] Good [] Fair  [] Poor    Patient requires continued skilled intervention: [x] Yes  [] No        PLAN: Cont strengthening, standing work  [x] Continue per plan of care [] Alter current plan (see comments)  [] Plan of care initiated [] Hold pending MD visit [] Discharge    Electronically signed by: Violet Guadarrama, PT,DPT [id number]    Note: If patient does not return for scheduled/recommended follow up visits, this note will serve as a discharge from care along with the most recent update on progress.

## 2021-06-03 ENCOUNTER — HOSPITAL ENCOUNTER (OUTPATIENT)
Dept: PHYSICAL THERAPY | Age: 66
Setting detail: THERAPIES SERIES
Discharge: HOME | End: 2021-06-03
Payer: COMMERCIAL

## 2021-06-03 PROCEDURE — 97110 THERAPEUTIC EXERCISES: CPT

## 2021-06-03 PROCEDURE — 97140 MANUAL THERAPY 1/> REGIONS: CPT

## 2021-06-03 NOTE — FLOWSHEET NOTE
at EOT  10x ea          Manual Intervention (19667)      STM/rollerstick bilateral quads, ITB, glutes  Long axis distraction RLE 12     Prone: Sacral rocking and decompression  Lumbar unilateral PAs G2-3 7'     SIJ assessment and MET for L posterior innominate 5'  Decreased pain after MET w/ ambulation               NMR re-education (74869)       SLB 10\" 5x ea    HL TA w/ march 10x ea     Alternating shoulder ext w/ step fwd and TA 10x ea                             Therapeutic Activity (86174)                   Modalities              Other Therapeutic Activities:  Pt was educated on PT POC, Diagnosis, Prognosis, pathomechanics as well as frequency and duration of scheduling future physical therapy appointments. Time was also taken on this day to answer all patient questions and participation in PT. Reviewed appointment policy in detail with patient and patient verbalized understanding. Home Exercise Program:  Access Code: [email or web address]  URL: [email or web address]  Date: 06/01/2021  Prepared by: Brunilda Wyatt    Therapeutic Exercise and NMR EXR  [x] (97274) Provided verbal/tactile cueing for activities related to strengthening, flexibility, endurance, ROM  for improvements in proximal hip and core control with self care, mobility, lifting and ambulation.  [] (32310) Provided verbal/tactile cueing for activities related to improving balance, coordination, kinesthetic sense, posture, motor skill, proprioception  to assist with core control in self care, mobility, lifting, and ambulation.      Therapeutic Activities:    [] (78717 or 22660) Provided verbal/tactile cueing for activities related to improving balance, coordination, kinesthetic sense, posture, motor skill, proprioception and motor activation to allow for proper function  with self care and ADLs  [] (26292) Provided training and instruction to the patient for proper core and proximal hip recruitment and positioning with ambulation re-education Home Exercise Program:    [x] (17393) Reviewed/Progressed HEP activities related to strengthening, flexibility, endurance, ROM of core, proximal hip and LE for functional self-care, mobility, lifting and ambulation   [] (11871) Reviewed/Progressed HEP activities related to improving balance, coordination, kinesthetic sense, posture, motor skill, proprioception of core, proximal hip and LE for self care, mobility, lifting, and ambulation      Manual Treatments:  PROM / STM / Oscillations-Mobs:  G-I, II, III, IV (PA's, Inf., Post.)  [x] (61536) Provided manual therapy to mobilize proximal hip and LS spine soft tissue/joints for the purpose of modulating pain, promoting relaxation,  increasing ROM, reducing/eliminating soft tissue swelling/inflammation/restriction, improving soft tissue extensibility and allowing for proper ROM for normal function with self care, mobility, lifting and ambulation. Approval Dates:  CPT Code Units Approved Units Used  Date Updated:                     Charges:  Timed Code Treatment Minutes: 40   Total Treatment Minutes: 40     [] EVAL (LOW) 29477 (typically 20 minutes face-to-face)  [] EVAL (MOD) 16658 (typically 30 minutes face-to-face)  [] EVAL (HIGH) 17883 (typically 45 minutes face-to-face)  [] RE-EVAL     [x] TR(47379) x   1 [] Dry needle 1 or 2 Muscles (08441)  [] NMR (57051) x     [] Dry needle 3+ Muscles (10373)  [x] Manual (75659) x 2    [] Ultrasound (15433) x  [] TA (90516) x     [] Mech Traction (23429)  [] ES(attended) (75605)     [] ES (un) (98021):   [] Vasopump (45639) [] Ionto (33345)   [] Other:    GOALS:  Patient stated goal: Improve strength and mobility  [] Progressing: [] Met: [] Not Met: [] Adjusted  Therapist goals for Patient:   Short Term Goals: To be achieved in: 2 weeks  1. Independent in HEP and progression per patient tolerance, in order to prevent re-injury. [] Progressing: [] Met: [] Not Met: [] Adjusted  2.  Patient will have a decrease in pain to facilitate improvement in movement, function, and ADLs as indicated by Functional Deficits. [] Progressing: [] Met: [] Not Met: [] Adjusted    Long Term Goals: To be achieved in: 8 weeks  1. Disability index score of 25% or less for the [place] and/or 38% or less on LEFS to assist with reaching prior level of function. [] Progressing: [] Met: [] Not Met: [] Adjusted  2. Patient will demonstrate increased AROM to 20 deg lumbar extension, full lateral flexion L to allow for proper joint functioning as indicated by patients Functional Deficits. [] Progressing: [] Met: [] Not Met: [] Adjusted  3. Patient will demonstrate an increase in Strength to 4+/5 grossly at bilateral hips and good core activation to allow for proper functional mobility as indicated by patients Functional Deficits. [] Progressing: [] Met: [] Not Met: [] Adjusted  4. Patient will return to walking, sitting and standing for work related activities without increased symptoms or restriction. [] Progressing: [] Met: [] Not Met: [] Adjusted  5. Patient will return to reciprocal stair negotiation without increased symptoms or restriction  (patient specific functional goal)    [] Progressing: [] Met: [] Not Met: [] Adjusted    ASSESSMENT:  Pt demo'd anterior innominate on R side today, addressed with MET which helped. Pt able to assume prone position today and had significant stiffness on R side of lumbosacral spine. Limited therex d/t late arrival.    Treatment/Activity Tolerance:  [x] Patient tolerated treatment well [] Patient limited by fatique  [] Patient limited by pain  [] Patient limited by other medical complications  [] Other:     Overall Progression Towards Functional goals/ Treatment Progress Update:  [] Patient is progressing as expected towards functional goals listed. [] Progression is slowed due to complexities/Impairments listed. [] Progression has been slowed due to co-morbidities.   [x] Plan just implemented, too soon to assess goals progression <30days   [] Goals require adjustment due to lack of progress  [] Patient is not progressing as expected and requires additional follow up with physician  [] Other:    Prognosis for POC: [x] Good [] Fair  [] Poor    Patient requires continued skilled intervention: [x] Yes  [] No        PLAN: Cont strengthening, standing work  [x] Continue per plan of care [] Alter current plan (see comments)  [] Plan of care initiated [] Hold pending MD visit [] Discharge    Electronically signed by: Tacos Altamirano, PT,DPT [id number]    Note: If patient does not return for scheduled/recommended follow up visits, this note will serve as a discharge from care along with the most recent update on progress.

## 2021-06-10 ENCOUNTER — HOSPITAL ENCOUNTER (OUTPATIENT)
Dept: PHYSICAL THERAPY | Age: 66
Setting detail: THERAPIES SERIES
Discharge: HOME | End: 2021-06-10
Payer: COMMERCIAL

## 2021-06-10 PROCEDURE — 97140 MANUAL THERAPY 1/> REGIONS: CPT

## 2021-06-10 PROCEDURE — 97112 NEUROMUSCULAR REEDUCATION: CPT

## 2021-06-10 NOTE — FLOWSHEET NOTE
30\" 3 R/L             Good form      Minisquat  10x Cues for form throughout      Prone hip ext alternating at EOT  10x ea          Manual Intervention (29771)      STM R quad and ITB  Long axis distraction RLE 6     Prone: Sacral rocking and decompression  Lumbar unilateral PAs G2-3 7'                 NMR re-education (35218)       SLB 10\" 10x ea    March w/ RTB ext 5x ea  Challenging- cues for glute activation   Staggered stance RTB ext 5\" 10x R/L    Hip hike bilat  10x R/L                      Therapeutic Activity (19673)                   Modalities              Other Therapeutic Activities:  Pt was educated on PT POC, Diagnosis, Prognosis, pathomechanics as well as frequency and duration of scheduling future physical therapy appointments. Time was also taken on this day to answer all patient questions and participation in PT. Reviewed appointment policy in detail with patient and patient verbalized understanding. Home Exercise Program:  Access Code: [email or web address]  URL: [email or web address]  Date: 06/01/2021  Prepared by: Tasia Sánchez    Therapeutic Exercise and NMR EXR  [x] (24771) Provided verbal/tactile cueing for activities related to strengthening, flexibility, endurance, ROM  for improvements in proximal hip and core control with self care, mobility, lifting and ambulation.  [] (25114) Provided verbal/tactile cueing for activities related to improving balance, coordination, kinesthetic sense, posture, motor skill, proprioception  to assist with core control in self care, mobility, lifting, and ambulation.      Therapeutic Activities:    [] (02552 or 69112) Provided verbal/tactile cueing for activities related to improving balance, coordination, kinesthetic sense, posture, motor skill, proprioception and motor activation to allow for proper function  with self care and ADLs  [] (28511) Provided training and instruction to the patient for proper core and proximal hip recruitment and positioning with ambulation re-education     Home Exercise Program:    [x] (68077) Reviewed/Progressed HEP activities related to strengthening, flexibility, endurance, ROM of core, proximal hip and LE for functional self-care, mobility, lifting and ambulation   [] (13636) Reviewed/Progressed HEP activities related to improving balance, coordination, kinesthetic sense, posture, motor skill, proprioception of core, proximal hip and LE for self care, mobility, lifting, and ambulation      Manual Treatments:  PROM / STM / Oscillations-Mobs:  G-I, II, III, IV (PA's, Inf., Post.)  [x] (09049) Provided manual therapy to mobilize proximal hip and LS spine soft tissue/joints for the purpose of modulating pain, promoting relaxation,  increasing ROM, reducing/eliminating soft tissue swelling/inflammation/restriction, improving soft tissue extensibility and allowing for proper ROM for normal function with self care, mobility, lifting and ambulation. Approval Dates:  CPT Code Units Approved Units Used  Date Updated:                     Charges:  Timed Code Treatment Minutes: 32   Total Treatment Minutes: 32     [] EVAL (LOW) 62332 (typically 20 minutes face-to-face)  [] EVAL (MOD) 42802 (typically 30 minutes face-to-face)  [] EVAL (HIGH) 97420 (typically 45 minutes face-to-face)  [] RE-EVAL     [] GS(62694) x    [] Dry needle 1 or 2 Muscles (32428)  [x] NMR (76409) x  1   [] Dry needle 3+ Muscles (43556)  [x] Manual (27137) x 1   [] Ultrasound (43570) x  [] TA (39838) x     [] Mech Traction (87397)  [] ES(attended) (90011)     [] ES (un) (18198):   [] Vasopump (83683) [] Ionto (68606)   [] Other:    GOALS:  Patient stated goal: Improve strength and mobility  [] Progressing: [] Met: [] Not Met: [] Adjusted  Therapist goals for Patient:   Short Term Goals: To be achieved in: 2 weeks  1. Independent in HEP and progression per patient tolerance, in order to prevent re-injury. [] Progressing: [] Met: [] Not Met: [] Adjusted  2.  Patient implemented, too soon to assess goals progression <30days   [] Goals require adjustment due to lack of progress  [] Patient is not progressing as expected and requires additional follow up with physician  [] Other:    Prognosis for POC: [x] Good [] Fair  [] Poor    Patient requires continued skilled intervention: [x] Yes  [] No        PLAN: Reassess NV  [x] Continue per plan of care [] Alter current plan (see comments)  [] Plan of care initiated [] Hold pending MD visit [] Discharge    Electronically signed by: Eliseo Covarrubias, PT,DPT [id number]    Note: If patient does not return for scheduled/recommended follow up visits, this note will serve as a discharge from care along with the most recent update on progress.

## 2021-06-15 ENCOUNTER — HOSPITAL ENCOUNTER (OUTPATIENT)
Dept: PHYSICAL THERAPY | Age: 66
Setting detail: THERAPIES SERIES
Discharge: HOME | End: 2021-06-15
Payer: COMMERCIAL

## 2021-06-15 PROCEDURE — 97110 THERAPEUTIC EXERCISES: CPT

## 2021-06-15 PROCEDURE — 97140 MANUAL THERAPY 1/> REGIONS: CPT

## 2021-06-15 NOTE — FLOWSHEET NOTE
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)  · Treatment Diagnosis: Lumbar pain (M54.5), Weakness (H24.3)    Insurance/Certification information:  PT Insurance Information: [facility]  Physician Information:  Referring Practitioner: Silverio Lockhart PA-C  Plan of care signed (Y/N):     Date of Patient follow up with Physician: none scheduled     Progress Report: []  Yes  [x]  No     Date Range for reporting period:  Beginnin2021  Ending:    Progress report due (10 Rx/or 30 days whichever is less):     Recertification due (POC duration/ or 90 days whichever is less): 21    Visit # POC/ Insurance Allowable Auth Needed   8 30 []Yes    [x]No     Functional Scale:       Date Assessed: at eval  Test: Live  Score: 50/100    Pain level:  4/10     History of Injury: Pt with history of lumbar and pelvic pain a year ago, flared up again on  that brought her to the ER. Currently off of work d/t mobility limitations. SUBJECTIVE: Pt states .      OBJECTIVE:  7 min late   Observation:   Test measurements:      RESTRICTIONS/PRECAUTIONS: breast cancer, HTN, RA    Exercises/Interventions:    Therapeutic Ex (54771)   Min: Sets/sec Reps Notes/Cues      Prone prop onto elbows 10\" 10             Min range   challenging            Supine piriformis stretch (fig 4) 30\" 3 R/L             Good form      Minisquat  10x Cues for form throughout      Prone hip ext alternating at EOT  10x ea          Manual Intervention ([address] [facility])      Rollerstick B quad and ITB  Long axis distraction RLE 6     Prone: Sacral rocking and decompression  Lumbar unilateral PAs G2-3  Rollerstick bilateral glutes  Hip IR/ER stretch bilat 17'                 NMR re-education (68692)       SLB 10\" 10x ea    Marching w/ single UE support 10x     Staggered stance RTB ext 5\" 10x R/L       Lateral band walk in // bars 3 laps 15 ft                Therapeutic Activity (78539)                   Modalities              Other Therapeutic Activities:  Pt was educated on PT POC, Diagnosis, Prognosis, pathomechanics as well as frequency and duration of scheduling future physical therapy appointments. Time was also taken on this day to answer all patient questions and participation in PT. Reviewed appointment policy in detail with patient and patient verbalized understanding. Home Exercise Program:  Access Code: [email or web address]  URL: [email or web address]  Date: 06/01/2021  Prepared by: Tasia Sánchez    Therapeutic Exercise and NMR EXR  [x] (76268) Provided verbal/tactile cueing for activities related to strengthening, flexibility, endurance, ROM  for improvements in proximal hip and core control with self care, mobility, lifting and ambulation.  [] (63898) Provided verbal/tactile cueing for activities related to improving balance, coordination, kinesthetic sense, posture, motor skill, proprioception  to assist with core control in self care, mobility, lifting, and ambulation.      Therapeutic Activities:    [] (78758 or 95455) Provided verbal/tactile cueing for activities related to improving balance, coordination, kinesthetic sense, posture, motor skill, proprioception and motor activation to allow for proper function  with self care and ADLs  [] (63586) Provided training and instruction to the patient for proper core and proximal hip recruitment and positioning with ambulation re-education     Home Exercise Program:    [x] (28387) Reviewed/Progressed HEP by Functional Deficits. [] Progressing: [] Met: [] Not Met: [] Adjusted    Long Term Goals: To be achieved in: 8 weeks  1. Disability index score of 25% or less for the [place] and/or 38% or less on LEFS to assist with reaching prior level of function. [] Progressing: [] Met: [] Not Met: [] Adjusted  2. Patient will demonstrate increased AROM to 20 deg lumbar extension, full lateral flexion L to allow for proper joint functioning as indicated by patients Functional Deficits. [] Progressing: [] Met: [] Not Met: [] Adjusted  3. Patient will demonstrate an increase in Strength to 4+/5 grossly at bilateral hips and good core activation to allow for proper functional mobility as indicated by patients Functional Deficits. [] Progressing: [] Met: [] Not Met: [] Adjusted  4. Patient will return to walking, sitting and standing for work related activities without increased symptoms or restriction. [] Progressing: [] Met: [] Not Met: [] Adjusted  5. Patient will return to reciprocal stair negotiation without increased symptoms or restriction  (patient specific functional goal)    [] Progressing: [] Met: [] Not Met: [] Adjusted    ASSESSMENT:  Pt doing well today, focused on manual intervention to help with muscular tightness and work on joint mobility in hips. Discussed dc at nv. Treatment/Activity Tolerance:  [x] Patient tolerated treatment well [] Patient limited by fatique  [] Patient limited by pain  [] Patient limited by other medical complications  [] Other:     Overall Progression Towards Functional goals/ Treatment Progress Update:  [] Patient is progressing as expected towards functional goals listed. [] Progression is slowed due to complexities/Impairments listed. [] Progression has been slowed due to co-morbidities.   [x] Plan just implemented, too soon to assess goals progression <30days   [] Goals require adjustment due to lack of progress  [] Patient is not progressing as expected and requires

## 2021-06-17 ENCOUNTER — HOSPITAL ENCOUNTER (OUTPATIENT)
Dept: PHYSICAL THERAPY | Age: 66
Setting detail: THERAPIES SERIES
Discharge: HOME | End: 2021-06-17
Payer: COMMERCIAL

## 2021-06-17 NOTE — FLOWSHEET NOTE
[facility]  [address]  Phone: [phone]   Fax:     [phone]    Physical Therapy  Cancellation/No-show Note  Patient Name:  Joe Garcia  :  1955   Date:  2021  Cancelled visits to date: 2  No-shows to date: 2    Patient status for today's appointment patient:  [x]  Cancelled  []  Rescheduled appointment  []  No-show     Reason given by patient:  []  Patient ill  []  Conflicting appointment  []  No transportation    []  Conflict with work  [x]  No reason given  []  Other:     Comments:      Phone call information:   []  Phone call made today to patient at _ time at number provided:      []  Patient answered, conversation as follows:    []  Patient did not answer, message left as follows:  [x]  Phone call not made today    Electronically signed by:  Violet Guadarrama, [address] [place] [address]

## 2021-07-19 ENCOUNTER — HOSPITAL ENCOUNTER (EMERGENCY)
Age: 66
Discharge: HOME | End: 2021-07-20
Attending: EMERGENCY MEDICINE
Payer: COMMERCIAL

## 2021-07-19 ENCOUNTER — APPOINTMENT (OUTPATIENT)
Dept: GENERAL RADIOLOGY | Age: 66
End: 2021-07-19
Payer: COMMERCIAL

## 2021-07-19 DIAGNOSIS — M79.89 RIGHT LEG SWELLING: Primary | ICD-10-CM

## 2021-07-19 DIAGNOSIS — Z87.39 HX OF GOUT: ICD-10-CM

## 2021-07-19 LAB
A/G RATIO: 1.1 (ref 1.1–2.2)
ALBUMIN SERPL-MCNC: 4.2 G/DL (ref 3.4–5)
ALP BLD-CCNC: 100 U/L (ref 40–129)
ALT SERPL-CCNC: 14 U/L (ref 10–40)
ANION GAP SERPL CALCULATED.3IONS-SCNC: 18 MMOL/L (ref 3–16)
AST SERPL-CCNC: 17 U/L (ref 15–37)
BASOPHILS ABSOLUTE: 0 K/UL (ref 0–0.2)
BASOPHILS RELATIVE PERCENT: 0.6 %
BILIRUB SERPL-MCNC: 0.8 MG/DL (ref 0–1)
BUN BLDV-MCNC: 15 MG/DL (ref 7–20)
C-REACTIVE PROTEIN: 251.6 MG/L (ref 0–5.1)
CALCIUM SERPL-MCNC: 10.2 MG/DL (ref 8.3–10.6)
CHLORIDE BLD-SCNC: 102 MMOL/L (ref 99–110)
CO2: 20 MMOL/L (ref 21–32)
CREAT SERPL-MCNC: 1.4 MG/DL (ref 0.6–1.2)
EOSINOPHILS ABSOLUTE: 0 K/UL (ref 0–0.6)
EOSINOPHILS RELATIVE PERCENT: 0.1 %
GFR AFRICAN AMERICAN: 46
GFR NON-AFRICAN AMERICAN: 38
GLOBULIN: 3.8 G/DL
GLUCOSE BLD-MCNC: 107 MG/DL (ref 70–99)
HCT VFR BLD CALC: 31.2 % (ref 36–48)
HEMOGLOBIN: 10.9 G/DL (ref 12–16)
LACTIC ACID: 2.6 MMOL/L (ref 0.4–2)
LYMPHOCYTES ABSOLUTE: 1.1 K/UL (ref 1–5.1)
LYMPHOCYTES RELATIVE PERCENT: 13.8 %
MAGNESIUM: 2 MG/DL (ref 1.8–2.4)
MCH RBC QN AUTO: 30.6 PG (ref 26–34)
MCHC RBC AUTO-ENTMCNC: 34.8 G/DL (ref 31–36)
MCV RBC AUTO: 87.8 FL (ref 80–100)
MONOCYTES ABSOLUTE: 0.6 K/UL (ref 0–1.3)
MONOCYTES RELATIVE PERCENT: 7.4 %
NEUTROPHILS ABSOLUTE: 6.2 K/UL (ref 1.7–7.7)
NEUTROPHILS RELATIVE PERCENT: 78.1 %
PDW BLD-RTO: 15.4 % (ref 12.4–15.4)
PLATELET # BLD: 330 K/UL (ref 135–450)
PMV BLD AUTO: 7.7 FL (ref 5–10.5)
POTASSIUM REFLEX MAGNESIUM: 3.3 MMOL/L (ref 3.5–5.1)
RBC # BLD: 3.55 M/UL (ref 4–5.2)
SEDIMENTATION RATE, ERYTHROCYTE: 117 MM/HR (ref 0–30)
SODIUM BLD-SCNC: 140 MMOL/L (ref 136–145)
TOTAL PROTEIN: 8 G/DL (ref 6.4–8.2)
URIC ACID, SERUM: 12.7 MG/DL (ref 2.6–6)
WBC # BLD: 8 K/UL (ref 4–11)

## 2021-07-19 PROCEDURE — 86140 C-REACTIVE PROTEIN: CPT

## 2021-07-19 PROCEDURE — 85652 RBC SED RATE AUTOMATED: CPT

## 2021-07-19 PROCEDURE — 96375 TX/PRO/DX INJ NEW DRUG ADDON: CPT

## 2021-07-19 PROCEDURE — 6360000002 HC RX W HCPCS: Performed by: NURSE PRACTITIONER

## 2021-07-19 PROCEDURE — 80053 COMPREHEN METABOLIC PANEL: CPT

## 2021-07-19 PROCEDURE — 87040 BLOOD CULTURE FOR BACTERIA: CPT

## 2021-07-19 PROCEDURE — 96374 THER/PROPH/DIAG INJ IV PUSH: CPT

## 2021-07-19 PROCEDURE — 71046 X-RAY EXAM CHEST 2 VIEWS: CPT

## 2021-07-19 PROCEDURE — 83735 ASSAY OF MAGNESIUM: CPT

## 2021-07-19 PROCEDURE — 84550 ASSAY OF BLOOD/URIC ACID: CPT

## 2021-07-19 PROCEDURE — 6370000000 HC RX 637 (ALT 250 FOR IP): Performed by: NURSE PRACTITIONER

## 2021-07-19 PROCEDURE — 6370000000 HC RX 637 (ALT 250 FOR IP): Performed by: EMERGENCY MEDICINE

## 2021-07-19 PROCEDURE — 99283 EMERGENCY DEPT VISIT LOW MDM: CPT

## 2021-07-19 PROCEDURE — 83605 ASSAY OF LACTIC ACID: CPT

## 2021-07-19 PROCEDURE — 85025 COMPLETE CBC W/AUTO DIFF WBC: CPT

## 2021-07-19 RX ORDER — METHYLPREDNISOLONE 4 MG/1
TABLET ORAL
Qty: 1 KIT | Refills: 0 | Status: SHIPPED | OUTPATIENT
Start: 2021-07-19

## 2021-07-19 RX ORDER — METHYLPREDNISOLONE SODIUM SUCCINATE 125 MG/2ML
125 INJECTION, POWDER, LYOPHILIZED, FOR SOLUTION INTRAMUSCULAR; INTRAVENOUS ONCE
Status: COMPLETED | OUTPATIENT
Start: 2021-07-19 | End: 2021-07-19

## 2021-07-19 RX ORDER — INDOMETHACIN 50 MG/1
50 CAPSULE ORAL 2 TIMES DAILY WITH MEALS
Qty: 14 CAPSULE | Refills: 0 | Status: SHIPPED | OUTPATIENT
Start: 2021-07-19 | End: 2021-07-26

## 2021-07-19 RX ORDER — COLCHICINE 0.6 MG/1
1.2 TABLET ORAL ONCE
Status: COMPLETED | OUTPATIENT
Start: 2021-07-19 | End: 2021-07-19

## 2021-07-19 RX ORDER — MORPHINE SULFATE 4 MG/ML
4 INJECTION, SOLUTION INTRAMUSCULAR; INTRAVENOUS ONCE
Status: COMPLETED | OUTPATIENT
Start: 2021-07-19 | End: 2021-07-19

## 2021-07-19 RX ORDER — HYDROCODONE BITARTRATE AND ACETAMINOPHEN 5; 325 MG/1; MG/1
1 TABLET ORAL EVERY 6 HOURS PRN
Qty: 8 TABLET | Refills: 0 | Status: SHIPPED | OUTPATIENT
Start: 2021-07-19 | End: 2021-07-21

## 2021-07-19 RX ORDER — CEPHALEXIN 500 MG/1
500 CAPSULE ORAL ONCE
Status: COMPLETED | OUTPATIENT
Start: 2021-07-19 | End: 2021-07-19

## 2021-07-19 RX ORDER — CEPHALEXIN 500 MG/1
1000 CAPSULE ORAL 2 TIMES DAILY
Qty: 28 CAPSULE | Refills: 0 | Status: SHIPPED | OUTPATIENT
Start: 2021-07-19 | End: 2021-07-26

## 2021-07-19 RX ORDER — COLCHICINE 0.6 MG/1
TABLET ORAL
Qty: 30 TABLET | Refills: 0 | Status: SHIPPED | OUTPATIENT
Start: 2021-07-19

## 2021-07-19 RX ADMIN — MORPHINE SULFATE 4 MG: 4 INJECTION INTRAVENOUS at 21:16

## 2021-07-19 RX ADMIN — CEPHALEXIN 500 MG: 500 CAPSULE ORAL at 23:39

## 2021-07-19 RX ADMIN — COLCHICINE 1.2 MG: 0.6 TABLET, FILM COATED ORAL at 22:22

## 2021-07-19 RX ADMIN — METHYLPREDNISOLONE SODIUM SUCCINATE 125 MG: 125 INJECTION, POWDER, FOR SOLUTION INTRAMUSCULAR; INTRAVENOUS at 22:22

## 2021-07-19 ASSESSMENT — PAIN SCALES - GENERAL
PAINLEVEL_OUTOF10: 4
PAINLEVEL_OUTOF10: 9
PAINLEVEL_OUTOF10: 8

## 2021-07-19 ASSESSMENT — ENCOUNTER SYMPTOMS
SHORTNESS OF BREATH: 0
COLOR CHANGE: 1

## 2021-07-19 ASSESSMENT — PAIN DESCRIPTION - ORIENTATION: ORIENTATION: RIGHT

## 2021-07-19 ASSESSMENT — PAIN DESCRIPTION - DESCRIPTORS: DESCRIPTORS: ACHING

## 2021-07-19 ASSESSMENT — PAIN DESCRIPTION - LOCATION: LOCATION: LEG

## 2021-07-20 VITALS
HEART RATE: 89 BPM | DIASTOLIC BLOOD PRESSURE: 70 MMHG | RESPIRATION RATE: 16 BRPM | SYSTOLIC BLOOD PRESSURE: 120 MMHG | TEMPERATURE: 100 F | OXYGEN SATURATION: 94 %

## 2021-07-20 LAB — LACTIC ACID: 0.7 MMOL/L (ref 0.4–2)

## 2021-07-20 NOTE — ED PROVIDER NOTES
[facility]        Pt Name: Kirstin Ferris  MRN: [id number]  [place] 1955  Date of evaluation: 7/19/2021  Provider: RASHIDA Barrientos - MARGARETTE  PCP: Cherrie Hensley  Note Started: 8:39 PM EDT        I have seen and evaluated this patient with my supervising physician Afua Lyle MD.    [address] [place] [address]       Chief Complaint   Patient presents with    Leg Swelling     right knee, ankle, and foot, thinks it's gout       HISTORY OF PRESENT ILLNESS   (Location, Timing/Onset, Context/Setting, Quality, Duration, Modifying Factors, Severity, Associated Signs and Symptoms)  Note limiting factors. Chief Complaint: Right leg pain    Kirstin Ferris is a 73 y.o. female with PMH significant for gout, HTN, HLD, and hypothyroidism who presents to the emergency department today complaining of redness, swelling, pain, and warmth to the RLE from the knee down. Onset was 2 days ago. Symptoms started spontaneously and have been worsening. Pain rated 8/10. Pain worse with movement. No recent injury or trauma. No shortness of breath. Nursing Notes were all reviewed and agreed with or any disagreements were addressed in the HPI. REVIEW OF SYSTEMS    (2-9 systems for level 4, 10 or more for level 5)     Review of Systems   Constitutional: Negative for chills, diaphoresis and fever. Respiratory: Negative for shortness of breath. Cardiovascular: Negative for chest pain. Genitourinary: Negative for dysuria. Musculoskeletal: Positive for arthralgias, gait problem, joint swelling and myalgias (RLE). Skin: Positive for color change (redness and warmth RLE). Negative for rash and wound. Allergic/Immunologic: Negative for immunocompromised state. Neurological: Negative for dizziness, weakness, numbness and headaches. Hematological: Negative for adenopathy. Psychiatric/Behavioral: Negative for confusion.    All other systems reviewed and are negative. Positives and Pertinent negatives as per HPI. Except as noted above in the ROS, all other systems were reviewed and negative. PAST MEDICAL HISTORY     Past Medical History:   Diagnosis Date    Hashimoto disease     Hypertension     Hyperuricemia     Hypokalemia     Liver disease     Multiple thyroid nodules 2011    Recreational drug use     cocaine    Vitamin D deficiency          SURGICAL HISTORY     Past Surgical History:   Procedure Laterality Date     SECTION      GALLBLADDER SURGERY  2010    HYSTERECTOMY  2001    partial    TUBAL LIGATION           CURRENTMEDICATIONS       Previous Medications    ABEMACICLIB 100 MG TABS    Take 100 mg by mouth 2 times daily    ALLOPURINOL (ZYLOPRIM) 100 MG TABLET    Take 1 tablet by mouth 3 times daily. AMLODIPINE (NORVASC) 10 MG TABLET    Take 1 tablet by mouth daily. ATENOLOL (TENORMIN) 25 MG TABLET    Take 1 tablet by mouth daily. CYCLOBENZAPRINE (FLEXERIL) 10 MG TABLET    Take 10 mg by mouth 2 times daily as needed    LISINOPRIL (PRINIVIL) 20 MG TABLET    Take 1 tablet by mouth daily for 30 days. LOVASTATIN (MEVACOR) 40 MG TABLET    Take 1 tablet by mouth daily. POTASSIUM CHLORIDE (K-DUR) 10 MEQ TABLET    TAKE ONE TABLET BY MOUTH EVERY DAY    POTASSIUM CHLORIDE (KLOR-CON M) 20 MEQ EXTENDED RELEASE TABLET    TAKE ONE TABLET BY MOUTH DAILY    POTASSIUM CHLORIDE (KLOR-CON) 20 MEQ PACKET    Take 20 mEq by mouth once for 1 dose. ALLERGIES     Patient has no known allergies.     FAMILYHISTORY       Family History   Problem Relation Age of Onset    High Blood Pressure Mother     Cancer Brother         pancreas    Lupus Sister           SOCIAL HISTORY       Social History     Tobacco Use    Smoking status: Former Smoker     Packs/day: 0.30     Years: 13.00     Pack years: 3.90     Types: Cigarettes    Smokeless tobacco: Current User    Tobacco comment: interested in patch   Substance Use Topics    Alcohol use: No    Drug use: No     Comment: previous cocaine user       SCREENINGS             PHYSICAL EXAM    (up to 7 for level 4, 8 or more for level 5)     ED Triage Vitals [07/19/21 1821]   BP Temp Temp Source Pulse Resp SpO2 Height Weight   (!) 166/99 100 °F (37.8 °C) Oral 95 20 100 % -- --       Physical Exam  Vitals and nursing note reviewed. Constitutional:       General: She is not in acute distress. Appearance: Normal appearance. She is well-developed. She is not toxic-appearing. HENT:      Head: Normocephalic and atraumatic. Eyes:      General: No scleral icterus. Conjunctiva/sclera: Conjunctivae normal.   Neck:      Vascular: No JVD. Cardiovascular:      Rate and Rhythm: Normal rate and regular rhythm. Heart sounds: Normal heart sounds. Pulmonary:      Effort: Pulmonary effort is normal. No respiratory distress. Breath sounds: Normal breath sounds. Abdominal:      General: There is no distension. Palpations: Abdomen is soft. Abdomen is not rigid. Tenderness: There is no abdominal tenderness. Musculoskeletal:         General: Normal range of motion. Cervical back: Normal range of motion and neck supple. Right knee: Swelling present. Tenderness present. Right lower leg: Swelling and tenderness present. Right ankle: Swelling present. Tenderness present. Skin:     General: Skin is warm and dry. Capillary Refill: Capillary refill takes less than 2 seconds. Findings: Erythema (RLE) present. No rash. Neurological:      General: No focal deficit present. Mental Status: She is alert and oriented to person, place, and time. Cranial Nerves: Cranial nerves are intact. Sensory: Sensation is intact. Motor: Motor function is intact.    Psychiatric:         Mood and Affect: Mood normal.         DIAGNOSTIC RESULTS   LABS:    Labs Reviewed   CBC WITH AUTO DIFFERENTIAL - Abnormal; Notable for the following components:       Result Value    RBC 3.55 (*)     Hemoglobin 10.9 (*)     Hematocrit 31.2 (*)     All other components within normal limits    Narrative:     Performed at:  [facility]  [address], [facility]   Phone [phone]   URIC ACID - Abnormal; Notable for the following components:    Uric Acid, Serum 12.7 (*)     All other components within normal limits    Narrative:     Performed at:  [facility]  [address], [facility] [employer]   Phone [phone]   C-REACTIVE PROTEIN - Abnormal; Notable for the following components:    .6 (*)     All other components within normal limits    Narrative:     Performed at:  [address] [facility] [employer]   Phone [phone]   SEDIMENTATION RATE - Abnormal; Notable for the following components:    Sed Rate 117 (*)     All other components within normal limits    Narrative:     Performed at:  [facility]  [address], [place] [employer]   Phone [phone]   LACTIC ACID, PLASMA - Abnormal; Notable for the following components:    Lactic Acid 2.6 (*)     All other components within normal limits    Narrative:     Performed at:  [facility]  [address], [facility] [employer]   Phone [phone]   COMPREHENSIVE METABOLIC PANEL W/ REFLEX TO MG FOR LOW K - Abnormal; Notable for the following components:    Potassium reflex Magnesium 3.3 (*)     CO2 20 (*)     Anion Gap 18 (*)     Glucose 107 (*)     CREATININE 1.4 (*)     GFR Non- 38 (*)     GFR  46 (*)     All other components within normal limits    Narrative:     Performed at:  [place] [address], [facility]   Phone [phone]   CULTURE, BLOOD 2   CULTURE, BLOOD 1   MAGNESIUM presentation. Patient is hemodynamically stable, nontoxic, afebrile, and without tachycardia, tachypnea, and hypoxia. Physical exam as above. 73year-old lying in bed in no acute distress. Redness swelling warmth and tenderness to the RLE. Calf is soft. DP pulse palpable. Distal extremity pink and well perfused. No neurovascular deficits. Uric acid elevated at 12.7. CRP and ESR elevated. Lactic acid elevated. Stable kidney disease. Emergency department course included pain medication. No evidence for septic joint or compartment syndrome. Patient advised that this feels exactly like her gout and she is had it this bad before. Should be treated with Keflex prophylactically due to the concern for possible cellulitis. Either way patient does not want to stay in the hospital wants to go home. She was treated symptomatically and discharged home in stable condition with strict return precautions. At this time, the evidence for any other entities in the differential is insufficient to justify any further testing. This was explained to the patient. The patient was advised that persistent or worsening symptoms will require further evaluation. I discussed with Enoc Eden and/or family the exam results, diagnosis, care, prognosis, reasons to return and the importance of follow up. Patient and/or family is in full agreement with plan and all questions have been answered. Specific discharge instructions explained, including reasons to return to the emergency department. Enoc Eden is well appearing, non-toxic, and afebrile at the time of discharge. Patient was instructed to follow up with primary care provider in 24-48 hours, and to instructed to return to ED immediately for any new or worsening concerns. Enoc Eden verbalized understanding and discharged home. The patient tolerated their visit well.   They were seen and evaluated by the attending physician, Heather Taylor MD who agreed with the assessment and plan. The patient and / or the family were informed of the results of any tests, a time was given to answer questions, a plan was proposed and they agreed with plan. FINAL IMPRESSION      1. Right leg swelling    2. Hx of gout          DISPOSITION/PLAN   DISPOSITION Decision To Discharge 07/19/2021 11:35:40 PM      PATIENT REFERRED TO:  [address]  [address] [place] [id number] [phone]    Schedule an appointment as soon as possible for a visit       [facility] [place] Emergency Department  [address] [address]  [phone]  Go to   As needed      DISCHARGE MEDICATIONS:  New Prescriptions    CEPHALEXIN (KEFLEX) 500 MG CAPSULE    Take 2 capsules by mouth 2 times daily for 7 days    COLCHICINE (COLCRYS) 0.6 MG TABLET    One tablet per hour or every two hours until relief of gouty pain and inflammation, up to a total of 4 mg or until upset stomach occurs    HYDROCODONE-ACETAMINOPHEN (NORCO) 5-325 MG PER TABLET    Take 1 tablet by mouth every 6 hours as needed for Pain for up to 2 days. Sedation precautions please    INDOMETHACIN (INDOCIN) 50 MG CAPSULE    Take 1 capsule by mouth 2 times daily (with meals) for 7 days Do not take more than the recommended dose and frequency. Do not take any other NSAIDs such as ibuprofen or Motrin with this medication. METHYLPREDNISOLONE (MEDROL, NESSA,) 4 MG TABLET    By mouth.        DISCONTINUED MEDICATIONS:  Discontinued Medications    No medications on file              (Please note that portions of this note were completed with a voice recognition program.  Efforts were made to edit the dictations but occasionally words are mis-transcribed.)    RASHIDA Veras CNP (electronically signed)            RASHIDA Veras CNP  07/20/21 0003

## 2021-07-20 NOTE — ED PROVIDER NOTES
Attending Supervising Physicians Attestation Statement  I was present with the Mid Level Provider during the history and exam. I discussed the findings and plans with the Mid Level Provider and agree as documented in her note     64-year-old female with right leg swelling and pain. She states it feels exactly like her usual gout symptoms. Pain 8 out of 10 achy in nature. Onset 2 days ago. No other associated symptoms. There is swelling tenderness palpation over the right leg. Vitals:    07/19/21 1821 07/20/21 0005   BP: (!) 166/99 120/70   Pulse: 95 89   Resp: 20 16   Temp: 100 °F (37.8 °C)    TempSrc: Oral    SpO2: 100% 80     64-year-old female with concern for gout. Cannot rule out overlying cellulitis. No evidence of septic joint as she has great range of motion of the knee and the ankle without any significant pain. Patient was given analgesics. She was started on Keflex as well. Discussed close orthopedic and PCP follow-up. She verbalized understanding. I estimate there is LOW risk for Sepsis, MI, Stroke, Tamponade, PTX, Toxicity or other life threatening etiology thus I consider the discharge disposition reasonable. The patient is at low risk for mortality based on demographic, history and clinical factors. Given the best available information and clinical assessment, I estimate the risk of hospitalization to be greater than risk of treatment at home. I have explained to the patient that the risk could rapidly change, given precautions for return and instructions. Explained to patient that the risk for mortality is low based on demographic, history and clinical factors. I discussed with patient the results of evaluation in the ED, diagnosis, care, and prognosis. The plan is to discharge to home. Patient is in agreement with plan and questions have been answered. I also discussed with patient the reasons which may require a return visit and the importance of follow-up care.

## 2021-07-20 NOTE — ED NOTES
Discharge instructions and rxs reviewed with and given to pt. Pt verbalized understanding. PIV removed without complications. Pt tolerated well. Pain reduced. Out of room to ED exit doors via wheelchair with family.       Katina West RN  [phone]

## 2021-07-23 LAB — BLOOD CULTURE, ROUTINE: NORMAL
